# Patient Record
Sex: FEMALE | Race: WHITE | Employment: FULL TIME | URBAN - METROPOLITAN AREA
[De-identification: names, ages, dates, MRNs, and addresses within clinical notes are randomized per-mention and may not be internally consistent; named-entity substitution may affect disease eponyms.]

---

## 2019-01-31 PROBLEM — F41.9 ANXIETY DISORDER: Status: ACTIVE | Noted: 2019-01-31

## 2019-01-31 PROBLEM — F32.4 MAJOR DEPRESSIVE DISORDER IN PARTIAL REMISSION (HCC): Status: ACTIVE | Noted: 2019-01-31

## 2019-02-21 ENCOUNTER — OFFICE VISIT (OUTPATIENT)
Dept: RHEUMATOLOGY | Age: 34
End: 2019-02-21

## 2019-02-21 VITALS
RESPIRATION RATE: 18 BRPM | DIASTOLIC BLOOD PRESSURE: 97 MMHG | HEIGHT: 67 IN | BODY MASS INDEX: 45.99 KG/M2 | TEMPERATURE: 97.9 F | WEIGHT: 293 LBS | SYSTOLIC BLOOD PRESSURE: 147 MMHG | HEART RATE: 97 BPM

## 2019-02-21 DIAGNOSIS — M76.32 ILIOTIBIAL BAND FRICTION SYNDROME OF BOTH KNEES: ICD-10-CM

## 2019-02-21 DIAGNOSIS — M22.2X1 PATELLOFEMORAL ARTHRALGIA OF BOTH KNEES: ICD-10-CM

## 2019-02-21 DIAGNOSIS — M22.2X2 PATELLOFEMORAL ARTHRALGIA OF BOTH KNEES: ICD-10-CM

## 2019-02-21 DIAGNOSIS — E55.9 VITAMIN D DEFICIENCY: ICD-10-CM

## 2019-02-21 DIAGNOSIS — M70.61 TROCHANTERIC BURSITIS OF BOTH HIPS: ICD-10-CM

## 2019-02-21 DIAGNOSIS — M54.2 MUSCULOSKELETAL NECK PAIN: ICD-10-CM

## 2019-02-21 DIAGNOSIS — M70.62 TROCHANTERIC BURSITIS OF BOTH HIPS: ICD-10-CM

## 2019-02-21 DIAGNOSIS — M35.2: Primary | ICD-10-CM

## 2019-02-21 DIAGNOSIS — Z79.60 LONG-TERM USE OF IMMUNOSUPPRESSANT MEDICATION: ICD-10-CM

## 2019-02-21 DIAGNOSIS — M76.31 ILIOTIBIAL BAND FRICTION SYNDROME OF BOTH KNEES: ICD-10-CM

## 2019-02-21 RX ORDER — MONTELUKAST SODIUM 10 MG/1
10 TABLET ORAL DAILY
COMMUNITY
End: 2019-11-25 | Stop reason: SDUPTHER

## 2019-02-21 RX ORDER — METRONIDAZOLE 7.5 MG/G
LOTION TOPICAL DAILY
COMMUNITY
End: 2020-06-15

## 2019-02-21 RX ORDER — GLUCOSAMINE/CHONDR SU A SOD 750-600 MG
TABLET ORAL 2 TIMES DAILY
COMMUNITY
End: 2019-08-21

## 2019-02-21 RX ORDER — BISMUTH SUBSALICYLATE 262 MG
1 TABLET,CHEWABLE ORAL DAILY
COMMUNITY
End: 2022-01-24

## 2019-02-21 RX ORDER — METHOTREXATE 2.5 MG/1
7.5 TABLET ORAL
COMMUNITY
End: 2019-02-21 | Stop reason: ALTCHOICE

## 2019-02-21 NOTE — PROGRESS NOTES
REASON FOR VISIT    This is the initial evaluation for Ms. Billings Duty a 35 y.o.  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 The patient is referred to the [facility] at the request of Dr. Morgan Aburto. HISTORY OF PRESENT ILLNESS     I have reviewed and summarized old records from [facility] ([facility]). She follows with Dr. Morgan Aburto, [facility] and was last seen by him on 12/21/2018. This is his last HPI:    \"She was last seen in this clinic on 8/21/2018. She cancelled her 11/6/18 appointment because her father was in the hospital.    Ms. Nakul Jim is a 35year-old female presenting for follow up of Behcet's Disease. She fulfills both the recently proposed International Criteria for Behcet's Disease ([address] [facility]. 2014 Mar;28(3):338-47) and the older International Study Group Criteria ([employer]. 1990 May 5;335(3184):5360-9630) for Behcet's Disease on the basis of recurrent oral ulcerations on at least 3 occasions over a minimum 12-month period, recurrent genital ulcerations, and evidence of positive pathergy on the hands on examination. She also has known inflammatory arthritis. Disease activity largely remitted since I started her on methotrexate on 8/9/16. At last encounter on 8/21/18, she returned for follow up after a long hiatus. Her mucocutaneous disease remained in deep sustained remission on methotrexate in that interval. She was contemplating having a family within the next 1-2 years. On examination, her mucositis and synovitis were confirmed to remain in remission. She felt well with no complaints related to her systemic rheumatic condition. We initiated a gradual taper down and ultimately off of methotrexate. I noted that patients with sustained remission for a prolonged period of time may be able to tolerate weaning off of immunosuppression. I asked her to decrease from 15 mg weekly to 12.5 mg weekly today.  I planned to reassess at follow up in 3 months. Once she was committed to a time table for family planning, we planned to replace methotrexate with azathioprine if she were unable to taper off the medication with maintained control of her autoimmune disease. She needed a washout off methotrexate of at least 3 months and ideally 6 months before conceiving. Since the time of her last evaluation, she notes the following:    She has tolerated the decrease in methotrexate to 12.5 mg weekly and is eager to continue tapering. She also remains on daily folic acid. She had a viral URI within the past week and is recovering. She did not inform me of her illness until today. She held a week's worth of methotrexate during her illness, but plans to resume this week according to her usual schedule. She is moving to [facility] on [address]. I have made arrangements for Dr. Dipika Chan to take over her care. She has a confirmed appointment with Dr. Dipika Chan on 1/14/19. \"    Under assessment, he wrote \"She fulfills both the recently proposed International Criteria for Behcet's Disease ([address] 2014 Mar;28(3):338-47) and the older International Study Group Criteria (Lancet. 1990 May 5;335(7596):4463-7373) for Behcet's Disease on the basis of recurrent oral ulcerations on at least 3 occasions over a minimum 12-month period, recurrent genital ulcerations, and evidence of positive pathergy on the hands on examination. She also has had inflammatory arthritis. \"    During that visit, she had no mucositis or synovitis on exam and had recommended weaning off methotrexate from 15 mg weekly to 7.5 mg weekly. He also asked her to take leucovorin and folic acid 2 mg daily due to methotrexate fog. 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 She denies a history of thrombosis or miscarriages.      In 12/2212018, labs showed WBC 6.33, Hct 40.3%, platelets 628,287, creatinine 0.80 mg/dL, eGFR 97, albumin 4.7 g/dL, ALK 67 U/L, ALT 18 U/L, AST 18 U/L, vitamin D 25, ESR 58 mm/hr. Today, she no longer has oral ulcers or genital ulcers. She complains of diffuse stabbing, aching constant joint pains that cycle that migrates in a different areas. Today, she has has right outer hip pain, but before she had shoulder and ankles pain. She has right 3rd finger that has been bothering for several months. It is worse when she bends it and sharp at times. It has not worsened with her dose decrease from methotrexate. She notes ankle swelling in the afternoon. She has diffuse body stiffness in the morning that loosens up after minutes. She saw dermatology for facial rosacea and eczema around her lips and was prescribed. She also has a rash on her distal hand that is chronic and she feels that flares with her Bechet. She has not had a biopsy of the skin. methotrexate did not help it. betamethasone helps it. Therapy History includes:    Current DMARD therapy include: methotrexate 7.5 mg every Friday  Prior DMARD therapy include: none  Discontinued DMARDs because of inefficacy: None  Discontinued DMARDs because of side effects: None    REVIEW OF SYSTEMS    A 15 point review of systems was performed and summarized below. The questionnaire was reviewed with the patient and scanned into the patient's medical record.     General: denies recent weight gain, recent weight loss, fatigue, weakness, fever, night sweats  Musculoskeletal: endorses joint pain, joint swelling, morning stiffness (lasting minutes), muscle pain  Ears: denies ringing in ears, loss of hearing, deafness  Eyes: endorses dryness, denies pain, redness, loss of vision, double vision, blurred vision, foreign body sensation  Mouth: endorses dryness, denies sore tongue, oral ulcers, bleeding gums, loss of taste, increased dental caries  Nose: denies nosebleeds, loss of smell, nasal ulcers  Throat: denies frequent sore throats, hoarseness, difficulty in swallowing, pain in jaw while chewing  Neck: denies swollen glands, tender glands  Cardiopulmonary: endorses dry cough, wheezing, denies pain in chest, irregular heart beat, sudden changes in heart beat, shortness of breath, difficulty breathing at night, productive cough, coughing of blood  Gastrointestinal: endorses nausea, heartburn, denies stomach pain relieved by food, vomiting of blood/\"coffee grounds\", jaundice, increasing constipation, persistent diarrhea, blood in stools, black stools  Genitourinary: denies nocturia, difficult urination, pain or burning on urination, blood in urine, cloudy urine, pus in urine, genital discharge, frequent urination, vaginal dryness, rash/ulcers, sexual difficulties   Hematologic: endorses anemia, denies bleeding tendency, blood clots  Skin: endorses easy bruising, redness, hives, nodules/bumps, hair loss, denies sun sensitive, rash, skin tightness, color changes of hands or feet in the cold (Raynaud's)  Neurologic: endorses headaches, intermittent numbness or tingling in hands/feet, denies dizziness, muscle weakness,  memory loss  Psychiatric: endorses depression, excessive worries, denies PTSD, Bipolar  Sleep: endorses poor sleep, snoring, obstructive sleep apnea on CPAP, difficulty staying asleep, denies apnea, daytime somnolence, difficulty falling asleep    PAST MEDICAL HISTORY    She has a past medical history of Alopecia, Anxiety, Asthma, Behcet's disease ([address] Depression, Fibromyalgia, GERD (gastroesophageal reflux disease), Hashimoto's thyroiditis, IBS (irritable bowel syndrome), Insomnia, Sleep apnea, Vascular abnormality, and Vitamin D deficiency. FAMILY HISTORY    Her family history includes Alcohol abuse in her maternal cousin; Anxiety in her paternal aunt;  Arthritis in her mother; Asthma in her mother; Bipolar Disorder in her maternal cousin; Depression in her father; Diabetes in her father; Drug Abuse in her maternal cousin; Heart Disease in her father; Hypertension in her father; Other in her mother and paternal cousin. SOCIAL HISTORY    She reports that  has never smoked. she has never used smokeless tobacco. She reports that she drinks alcohol. She reports that she does not use drugs. GYNECOLOGIC HISTORY     0, Para 0, Living 0, Miscarriage 0  She denies severe pre-eclampsia, eclampsia or placental insufficiency    HEALTH MAINTENANCE    Immunizations    There is no immunization history on file for this patient. Age Appropriate Cancer Screening    PAP Smear: 2018    MEDICATIONS    Current Outpatient Medications   Medication Sig Dispense Refill    Biotin 2,500 mcg cap Take  by mouth daily.  ketoconazole (XOLEGEL) 2 % topical gel Apply  to affected area daily.  metroNIDAZOLE (METROLOTION) 0.75 % lotion Apply  to affected area daily.  montelukast (SINGULAIR) 10 mg tablet Take 10 mg by mouth daily.  multivitamin (ONE A DAY) tablet Take 1 Tab by mouth daily.  buPROPion XL (WELLBUTRIN XL) 300 mg XL tablet Take 1 Tab by mouth daily. 90 Tab 1    citalopram (CELEXA) 20 mg tablet Take 1 Tab by mouth daily. With 40 mg dose 90 Tab 1    traZODone (DESYREL) 50 mg tablet Take 1 Tab by mouth nightly. 30 Tab 1    citalopram (CELEXA) 40 mg tablet Take 1 Tab by mouth daily. With 20 mg dose 90 Tab 1    aspirin delayed-release 81 mg tablet Take 81 mg by mouth daily.  adapalene (DIFFERIN) 0.1 % topical gel Apply 1 Squirt to affected area nightly.  albuterol (VENTOLIN HFA) 90 mcg/actuation inhaler Take 1-2 Puffs by inhalation two (2) times a day.  clindamycin-benzoyl peroxide (BENZACLIN) 1-5 % topical gel Apply 1 Applicator to affected area two (2) times a day.  ergocalciferol (ERGOCALCIFEROL) 50,000 unit capsule Take 50,000 Units by mouth every seven (7) days.  levonorgestrel (MIRENA) 20 mcg/24 hr (5 years) IUD 1 Device by IntraUTERine route as directed.       levothyroxine (SYNTHROID) 200 mcg tablet Take 175 mcg by mouth daily.  loratadine (CLARITIN) 10 mg tablet Take 10 mg by mouth daily.  spironolactone (ALDACTONE) 25 mg tablet Take 25 mg by mouth three (3) times daily. ALLERGIES    No Known Allergies    PHYSICAL EXAMINATION    Visit Vitals  BP (!) 147/97   Pulse 97   Temp 97.9 °F (36.6 °C)   Resp 18   Ht 5' 7\" (1.702 m)   Wt 329 lb (149.2 kg)   BMI 51.53 kg/m²     Body mass index is 51.53 kg/m². General: Patient is alert, oriented x 3, not in acute distress    HEENT:   Conjunctiva are not injected and appear moist, oral mucous membranes are moist, there are no ulcers present, there is no alopecia, neck is supple, there is no lymphadenopathy    Cardiovascular:  Heart is regular rate and rhythm, no murmurs. Chest:  Lungs are clear to auscultation bilaterally. Extremities:  Free of clubbing, cyanosis, edema, extremities well perfused. Neurological exam:  Muscle strength is full in upper and lower extremities. Skin exam:  There are no rashes, no active Raynaud's, no livedo reticularis, no periungual erythema, no scalp tenderness. Facial rosacea  Lip angular erythema  Erythema on left dorsum hand    Musculoskeletal exam:  A comprehensive musculoskeletal exam was performed for all joints of each upper and lower extremity and assessed for swelling, tenderness and range of motion. Pertinent results are documented as below:    Multiple tender points  Muscular tenderness along trapezius  Bilateral torchanteric tenderness  Bilateral iliotibial band tenderness  Knee hyperextension with patellar laxity  No synovitis    DATA REVIEW    Studies Reviewed:     Prior medical records were reviewed and are summarized as below:    Laboratory data: summarized in the HPI    Imaging: summarized in the HPI.       ASSESSMENT AND PLAN    1) Behcet's Disease. (oral ulcer, genital ulcers, synovitis, rash) She has been under clinical remission on methotrexate 15 mg and recently 7.5 mg and was previously following with Dr. Geoff Conway at [facility]. The plan was to wean her off methotrexate since she has no disease recurrence. I suspect her joint pain is from fibromyalgia. I will discontinue her methotrexate, folic acid and leucovorin today. 2) Long Term Use of Immunosuppressants. The patient will be discontinued form immunomodulatory medications (methotrexate). 3) Bilateral Patellofemoral Arthralgia. I referred her to physical therapy. 4) Bilateral Trochanteric Bursitis with Iliotibial BAnd Syndrome. I referred her to physical therapy. 5) Musculoskeletal Neck Pain. This is likely form her poor posture and forward stooping neck posture. The etiology is usually from chronic slouching due to reading, smart phone use, video yesy, and/or computer work. This will cause muscular tension and spasms, which may result with referred headaches. This is not an inflammatory process nor is it an immune mediated condition. It is mechanical and should be treated with targeted physical therapy with the goal of realigning the neck and shoulder girdle into the normal anatomic alignment. Muscle relaxants and analgesics should be used as supportive treatment. I recommend physical therapy. I referred the patient to physical therapy. 6) Vitamin D Deficiency. She is on replacement. I will check her level today. 7) Fibromyalgia. her history, constellation of symptoms, and examination are consistent with a pain syndrome. She has a diagnosis of obstructive sleep apnea. Fibromyalgia is a disease characterized by chronic widespread musculoskeletal pain. Fibromyalgia is caused by abnormal processing of pain signals in the central nervous system, leading to exaggerated pain responses. Non-pharmacologic therapies such as cardiovascular exercise and Cognitive Behavioral Therapy have been shown to be of benefit ([address], Am J Med 2009).  Oscar Chi in particular has proven efficacy in the treatment of fibromyalgia (Fred Houser 2010). If pharmacotherapy is pursued, pregabalin (Lyrica), gabapentin (Neurontin), milnacipran (Violeta Morelle), and duloxetine (Cymbalta) are FDA approved medications for the treatment of fibromyalgia. Narcotics have not been proven to be efficacious in the treatment of fibromyalgia. In fact, narcotic use in this patient population has been observed to exacerbate depression, and may enhance the hyperalgesia which is characteristic of this condition (Lamarijaa Fabian Rheum 2006). They also are at increased risk for opioid-induced hyperalgesia due predominantly to central sensitization Kayleen Han al. Hidalgo Pock Clin Rheumatol. 2013 Mar;19(2):72-7). Specifically, a double-blind placebo-controlled trial by Christiano Oleary al published in 1995 demonstrated that intravenous morphine did not reduce pain in fibromyalgia patients. A study by Beti Cabrera al published in 2003 showed that fibromyalgia patients taking oral opiates did not experience improvement in their pain at four years of follow up, and also reported increased depression over the last two years of the study. There is subsequent concern that the prolonged use of narcotics to treat fibromyalgia may cause harm to these patients Gomez Mcarthur, Pain 2005). Finally, opioid use in fibromyalgia had poorer symptoms and functional and occupational status compared to nonusers (Dionne YUSUF et al. Pain Res Treat. 5736;6815:295560). We therefore recommend that narcotics be avoided in all patients with fibromyalgia. We recommend Oscar Chi stretching exercises for at least 30 minutes per day. The [employer] has made a videotape of Oscar Chi that She can borrow from [email or web address], purchase online or watch for free on [email or web address] Oscar Chi for Arthritis. We discussed treating secondary causes, such as sleep apnea, poor sleep quality, depression, anxiety, weight loss, vitamin deficiencies, such as vitamin D, and pursuing aquatherapy. I encouraged her to do [address].      My recommendations were provided to her and she was informed to follow up with her primary care physician for further management of her fibromyalgia. 8) Skin Rash. I explained that she need to have a biopsy to evaluate for an eczematous process versus an inflammatory process since it has not resolved while on methotrexate. The patient voiced understanding of the aforementioned assessment and plan. Summary of plan was provided in the After Visit Summary patient instructions. I also provided education about MyChart setup and utility.     TODAY'S ORDERS    Orders Placed This Encounter    TSH 3RD GENERATION    CBC WITH AUTOMATED DIFF    METABOLIC PANEL, COMPREHENSIVE    VITAMIN D, 25 HYDROXY    REFERRAL TO PHYSICAL THERAPY    REFERRAL TO PHYSICAL THERAPY    DISCONTD: methotrexate (RHEUMATREX) 2.5 mg tablet     Future Appointments   Date Time Provider Richi Arriola   2/26/2019 11:30 AM Eric Bruno NP [facility]   6/6/2019  8:40 AM Lico Mccann MD [address] [place], [address] [place] [address]    Adult Rheumatology   Rheumatology Ultrasound Certified  [facility]  A Part of [address]   Phone [phone]  Fax [phone]

## 2019-02-22 LAB
25(OH)D3+25(OH)D2 SERPL-MCNC: 23.9 NG/ML (ref 30–100)
ALBUMIN SERPL-MCNC: 4.5 G/DL (ref 3.5–5.5)
ALBUMIN/GLOB SERPL: 1.4 {RATIO} (ref 1.2–2.2)
ALP SERPL-CCNC: 71 IU/L (ref 39–117)
ALT SERPL-CCNC: 20 IU/L (ref 0–32)
AST SERPL-CCNC: 17 IU/L (ref 0–40)
BASOPHILS # BLD AUTO: 0 X10E3/UL (ref 0–0.2)
BASOPHILS NFR BLD AUTO: 0 %
BILIRUB SERPL-MCNC: 0.3 MG/DL (ref 0–1.2)
BUN SERPL-MCNC: 15 MG/DL (ref 6–20)
BUN/CREAT SERPL: 19 (ref 9–23)
CALCIUM SERPL-MCNC: 9.9 MG/DL (ref 8.7–10.2)
CHLORIDE SERPL-SCNC: 99 MMOL/L (ref 96–106)
CO2 SERPL-SCNC: 26 MMOL/L (ref 20–29)
CREAT SERPL-MCNC: 0.81 MG/DL (ref 0.57–1)
EOSINOPHIL # BLD AUTO: 0.2 X10E3/UL (ref 0–0.4)
EOSINOPHIL NFR BLD AUTO: 4 %
ERYTHROCYTE [DISTWIDTH] IN BLOOD BY AUTOMATED COUNT: 13.8 % (ref 12.3–15.4)
GLOBULIN SER CALC-MCNC: 3.3 G/DL (ref 1.5–4.5)
GLUCOSE SERPL-MCNC: 83 MG/DL (ref 65–99)
HCT VFR BLD AUTO: 41.3 % (ref 34–46.6)
HGB BLD-MCNC: 13.6 G/DL (ref 11.1–15.9)
IMM GRANULOCYTES # BLD AUTO: 0 X10E3/UL (ref 0–0.1)
IMM GRANULOCYTES NFR BLD AUTO: 0 %
LYMPHOCYTES # BLD AUTO: 1.8 X10E3/UL (ref 0.7–3.1)
LYMPHOCYTES NFR BLD AUTO: 36 %
MCH RBC QN AUTO: 32.4 PG (ref 26.6–33)
MCHC RBC AUTO-ENTMCNC: 32.9 G/DL (ref 31.5–35.7)
MCV RBC AUTO: 98 FL (ref 79–97)
MONOCYTES # BLD AUTO: 0.4 X10E3/UL (ref 0.1–0.9)
MONOCYTES NFR BLD AUTO: 8 %
NEUTROPHILS # BLD AUTO: 2.5 X10E3/UL (ref 1.4–7)
NEUTROPHILS NFR BLD AUTO: 52 %
PLATELET # BLD AUTO: 318 X10E3/UL (ref 150–379)
POTASSIUM SERPL-SCNC: 4.7 MMOL/L (ref 3.5–5.2)
PROT SERPL-MCNC: 7.8 G/DL (ref 6–8.5)
RBC # BLD AUTO: 4.2 X10E6/UL (ref 3.77–5.28)
SODIUM SERPL-SCNC: 142 MMOL/L (ref 134–144)
TSH SERPL DL<=0.005 MIU/L-ACNC: 0.58 UIU/ML (ref 0.45–4.5)
WBC # BLD AUTO: 4.9 X10E3/UL (ref 3.4–10.8)

## 2019-02-25 NOTE — PROGRESS NOTES
The results were reviewed and a letter was sent. Vitamin D is low --> continue ergocalciferol. Remaining labs are good.

## 2019-03-07 ENCOUNTER — PATIENT MESSAGE (OUTPATIENT)
Dept: RHEUMATOLOGY | Age: 34
End: 2019-03-07

## 2019-03-12 RX ORDER — ASPIRIN 81 MG/1
81 TABLET ORAL DAILY
Qty: 90 TAB | Refills: 0 | OUTPATIENT
Start: 2019-03-12

## 2019-03-12 RX ORDER — ERGOCALCIFEROL 1.25 MG/1
50000 CAPSULE ORAL
Qty: 12 CAP | Refills: 0 | Status: SHIPPED | OUTPATIENT
Start: 2019-03-12 | End: 2019-03-13 | Stop reason: DRUGHIGH

## 2019-03-12 RX ORDER — ERGOCALCIFEROL 1.25 MG/1
50000 CAPSULE ORAL
COMMUNITY
End: 2019-03-13 | Stop reason: DRUGHIGH

## 2019-03-13 RX ORDER — ASPIRIN 81 MG/1
81 TABLET ORAL DAILY
Qty: 90 TAB | Refills: 4 | Status: SHIPPED | OUTPATIENT
Start: 2019-03-13 | End: 2020-02-10

## 2019-03-13 RX ORDER — ERGOCALCIFEROL 1.25 MG/1
50000 CAPSULE ORAL
Qty: 12 CAP | Refills: 4 | Status: SHIPPED | OUTPATIENT
Start: 2019-03-14 | End: 2019-10-10 | Stop reason: SDUPTHER

## 2019-05-31 PROBLEM — E66.01 OBESITY, MORBID (HCC): Status: ACTIVE | Noted: 2019-05-31

## 2019-07-17 PROBLEM — S46.912A STRAIN OF LEFT SHOULDER: Status: ACTIVE | Noted: 2019-07-17

## 2019-07-17 PROBLEM — S39.012A LUMBAR STRAIN, INITIAL ENCOUNTER: Status: ACTIVE | Noted: 2019-07-17

## 2019-07-17 PROBLEM — M35.2 BEHCET'S DISEASE (HCC): Status: ACTIVE | Noted: 2019-07-17

## 2019-07-17 PROBLEM — S76.012A MUSCLE STRAIN OF LEFT GLUTEAL REGION: Status: ACTIVE | Noted: 2019-07-17

## 2019-08-21 ENCOUNTER — OFFICE VISIT (OUTPATIENT)
Dept: RHEUMATOLOGY | Age: 34
End: 2019-08-21

## 2019-08-21 VITALS
BODY MASS INDEX: 45.99 KG/M2 | TEMPERATURE: 97.9 F | DIASTOLIC BLOOD PRESSURE: 94 MMHG | SYSTOLIC BLOOD PRESSURE: 151 MMHG | RESPIRATION RATE: 18 BRPM | WEIGHT: 293 LBS | HEIGHT: 67 IN | HEART RATE: 91 BPM

## 2019-08-21 DIAGNOSIS — E55.9 VITAMIN D DEFICIENCY: ICD-10-CM

## 2019-08-21 DIAGNOSIS — M35.2 BEHCET'S DISEASE (HCC): Primary | ICD-10-CM

## 2019-08-21 DIAGNOSIS — M22.2X2 PATELLOFEMORAL ARTHRALGIA OF BOTH KNEES: ICD-10-CM

## 2019-08-21 DIAGNOSIS — Z79.60 LONG-TERM USE OF IMMUNOSUPPRESSANT MEDICATION: ICD-10-CM

## 2019-08-21 DIAGNOSIS — M22.2X1 PATELLOFEMORAL ARTHRALGIA OF BOTH KNEES: ICD-10-CM

## 2019-08-21 RX ORDER — DULOXETIN HYDROCHLORIDE 30 MG/1
30 CAPSULE, DELAYED RELEASE ORAL DAILY
COMMUNITY
End: 2019-08-27 | Stop reason: SDUPTHER

## 2019-08-21 NOTE — PATIENT INSTRUCTIONS
Apremilast (By mouth)   Apremilast (a-PRE-mi-last)  Treats psoriatic arthritis and plaque psoriasis. Brand Name(s): Mónica Cr Starter Pack   There may be other brand names for this medicine. When This Medicine Should Not Be Used: This medicine is not right for everyone. Do not use it if you had an allergic reaction to apremilast.  How to Use This Medicine:   Tablet  · Take your medicine as directed. Your dose may need to be changed several times to find what works best for you. · Swallow the tablet whole. Do not crush, break, or chew it. · Missed dose: Take a dose as soon as you remember. If it is almost time for your next dose, wait until then and take a regular dose. Do not take extra medicine to make up for a missed dose. · Store the medicine in a closed container at room temperature, away from heat, moisture, and direct light. Drugs and Foods to Avoid:   Ask your doctor or pharmacist before using any other medicine, including over-the-counter medicines, vitamins, and herbal products. · Some medicines can affect how apremilast works. Tell your doctor if you are using carbamazepine, phenobarbital, phenytoin, or rifampin. Warnings While Using This Medicine:   · Tell your doctor if you are pregnant or breastfeeding, or if you have kidney disease, low blood pressure, or a history of depression. · This medicine may cause depression or thoughts of suicide. · Your doctor will check your progress and the effects of this medicine at regular visits. Keep all appointments. · Keep all medicine out of the reach of children. Never share your medicine with anyone.   Possible Side Effects While Using This Medicine:   Call your doctor right away if you notice any of these side effects:  · Allergic reaction: Itching or hives, swelling in your face or hands, swelling or tingling in your mouth or throat, chest tightness, trouble breathing  · Feeling sad or depressed  · Severe diarrhea, nausea, vomiting  · Unusual changes in mood or behavior  If you notice these less serious side effects, talk with your doctor:   · Headache  · Weight loss  If you notice other side effects that you think are caused by this medicine, tell your doctor. Call your doctor for medical advice about side effects. You may report side effects to FDA at 7-005-FDA-1198  © 2017 [address] Information is for End User's use only and may not be sold, redistributed or otherwise used for commercial purposes. The above information is an  only. It is not intended as medical advice for individual conditions or treatments. Talk to your doctor, nurse or pharmacist before following any medical regimen to see if it is safe and effective for you.

## 2019-08-21 NOTE — PROGRESS NOTES
REASON FOR VISIT    This is a follow-up visit for Ms. David Gill for     ICD-10-CM   1. Behcet's syndrome ([facility] M35.2     Immunosuppression Screening (pending):  Quantiferon TB: pending  PPD:  Not performed  Hepatitis B: pending  Hepatitis C: pending    Therapy History includes:  Current DMARD therapy include: none  Prior DMARD therapy include: methotrexate 7.5 - 15 mg weekly  Discontinued DMARDs because of inefficacy: None  Discontinued DMARDs because of side effects: None  Contra-Indicated DMARDs because of pregnancy planning: methotrexate     Immunization History   Administered Date(s) Administered    Pneumococcal Polysaccharide (PPSV-23) 03/09/2017       Patient Active Problem List   Diagnosis Code    Major depressive disorder in partial remission ([address] F32.4    Anxiety disorder F41.9    Obesity, morbid ([address] E66.01    Behcet's disease ([address] M35.2    Strain of left shoulder S46.912A    Lumbar strain, initial encounter S39.012A    Muscle strain of left gluteal region S76.012A    Vitamin D deficiency E55.9    Long-term use of immunosuppressant medication Z79.899       HISTORY OF PRESENT ILLNESS    Ms. David Gill returns for a follow-up. On her last visit, I discontinued methotrexate since she was in remission. I felt her pain was from fibromyalgia. I reviewed [facility] records. Today, she complains of recurrence of genital and oral ulcers after stopping methotrexate around 6/24/2019. She also has been having more joint pain especially in her ankles associated with stiffness lasting up to an hour. The pain is aching. She cannot walk barefoot because of aggravated pain. Motrin has helped. She endorses sores in mouth, dry cough which may be from GERD.     She denies fever, weight loss, blurred vision, vision loss, ankle swelling, dyspnea, nausea, vomiting, dysphagia, abdominal pain, black or bloody stool, fall since last visit, rash, easy bruising and increased thirst.    Last toxicity monitoring by blood work was done on 8/02/2019 and did not reveal any significant adverse effects. The patient has had any interval hospital admissions, infections, or surgeries (except anal surgery)    REVIEW OF SYSTEMS    A comprehensive review of systems was performed and pertinent results are documented in the HPI, review of systems is otherwise non-contributory. PAST MEDICAL HISTORY    She has a past medical history of Alopecia, Anxiety, Asthma, Behcet's disease ([address] Depression, Fibromyalgia, GERD (gastroesophageal reflux disease), Hashimoto's thyroiditis, IBS (irritable bowel syndrome), Insomnia, Sleep apnea, Vascular abnormality, and Vitamin D deficiency. FAMILY HISTORY    Her family history includes Alcohol abuse in her maternal cousin; Anxiety in her paternal aunt; Arthritis in her mother; Asthma in her mother; Bipolar Disorder in her maternal cousin; Depression in her father; Diabetes in her father; Drug Abuse in her maternal cousin; Heart Disease in her father; Hypertension in her father; Other in her mother and paternal cousin. SOCIAL HISTORY    She reports that she has never smoked. She has never used smokeless tobacco. She reports that she drinks alcohol. She reports that she does not use drugs. MEDICATIONS    Current Outpatient Medications   Medication Sig Dispense Refill    DULoxetine (CYMBALTA) 30 mg capsule Take 30 mg by mouth daily.  apremilast (OTEZLA) 30 mg tab Take 30 mg by mouth two (2) times a day. Indications: Behcet's disease with mouth ulcers 30 Tab 11    buPROPion XL (WELLBUTRIN XL) 150 mg tablet Take 1 Tab by mouth daily. (Patient taking differently: Take 300 mg by mouth daily.) 30 Tab 0    augmented betamethasone dipropionate (DIPROLENE-AF) 0.05 % topical cream Apply  to affected area as needed for Skin Irritation.  Minoxidil (ROGAINE) 5 % foam by Apply Externally route.  Bacillus/protease/amylas/lipas (DIGESTIVE ADVANTAGE INTENS BOW PO) Take  by mouth.       traZODone (DESYREL) 50 mg tablet Take 1 Tab by mouth nightly. 90 Tab 1    aspirin delayed-release 81 mg tablet Take 1 Tab by mouth daily. 90 Tab 4    ergocalciferol (ERGOCALCIFEROL) 50,000 unit capsule Take 1 Cap by mouth every Monday and Thursday. 12 Cap 4    ketoconazole (XOLEGEL) 2 % topical gel Apply  to affected area daily.  metroNIDAZOLE (METROLOTION) 0.75 % lotion Apply  to affected area daily.  montelukast (SINGULAIR) 10 mg tablet Take 10 mg by mouth daily.  multivitamin (ONE A DAY) tablet Take 1 Tab by mouth daily.  adapalene (DIFFERIN) 0.1 % topical gel Apply 1 Squirt to affected area nightly.  albuterol (VENTOLIN HFA) 90 mcg/actuation inhaler Take 1-2 Puffs by inhalation two (2) times a day.  clindamycin-benzoyl peroxide (BENZACLIN) 1-5 % topical gel Apply 1 Applicator to affected area two (2) times a day.  levonorgestrel (MIRENA) 20 mcg/24 hr (5 years) IUD 1 Device by IntraUTERine route as directed.  levothyroxine (SYNTHROID) 200 mcg tablet Take 175 mcg by mouth daily.  loratadine (CLARITIN) 10 mg tablet Take 10 mg by mouth daily.  spironolactone (ALDACTONE) 25 mg tablet Take 25 mg by mouth three (3) times daily. ALLERGIES    No Known Allergies    PHYSICAL EXAMINATION    Visit Vitals  BP (!) 151/94   Pulse 91   Temp 97.9 °F (36.6 °C)   Resp 18   Ht 5' 7\" (1.702 m)   Wt 329 lb (149.2 kg)   BMI 51.53 kg/m²     Body mass index is 51.53 kg/m². General: Patient is alert, oriented x 3, not in acute distress    HEENT:   Sclerae are not injected and appear moist.  There is no alopecia. Neck is supple     Cardiovascular:  Heart is regular rate and rhythm, no murmurs. Chest:  Lungs are clear to auscultation bilaterally. No rhonchi, wheezes, or crackles.     Extremities:  Free of clubbing, cyanosis, edema    Neurological exam:  Muscle strength is full in upper and lower extremities     Skin exam:  There are no rashes, no alopecia, no discoid lesions, no active Raynaud's, no livedo reticularis, no periungual erythema. Facial rosacea  Erythema on left dorsum hand    Musculoskeletal exam:  A comprehensive musculoskeletal exam was performed for all joints of each upper and lower extremity and assessed for swelling, tenderness and range of motion. Positive results are documented as below:    Bilateral ankle tenderness    DATA REVIEW    Laboratory     Recent laboratory results were reviewed, summarized, and discussed with the patient. Imaging    Musculoskeletal Ultrasound    None    Radiographs    None    CT Imaging    None    MR Imaging    None    DXA     None    ASSESSMENT AND PLAN    This is a follow-up visit for Ms. Sahil Potter. 1) Behcet's Disease. (oral ulcer, genital ulcers, synovitis, rash) She has been under clinical remission on methotrexate 15 mg and recently 7.5 mg and was previously following with Dr. Syed Snowden at [facility]. I discontinued it but she had recurrence of her oral and vaginal ulcers. She has developed bilateral ankle arthritis. She is planning on pregnancy so I will avoid methotrexate. Clearence Cool was recently FDA approved for Behcet's disease, so I will start it. I gave her two loading dose samples. She is planning on pregnancy, so I will avoid methotrexate. Clearence Cool should be stopped within a week of conception planning. 2) Long Term Use of Immunosuppressants. The patient remains on immunomodulatory medications Martinazaida Mojica) and requires frequent toxicity monitoring by blood work. Respective labs were ordered (Quantiferon TB, chronic hepatitis panel). 3) Bilateral Patellofemoral Arthralgia. I had referred her to physical therapy. 4) Bilateral Trochanteric Bursitis with Iliotibial BAnd Syndrome. I hadreferred her to physical therapy. 5) Musculoskeletal Neck Pain. I had referred the patient to physical therapy. 6) Vitamin D Deficiency. She is on replacement. Her vitamin D was 23.9.  She is now on ergocalciferol 50,000 weekly. I will check her level. 7) Fibromyalgia. her history, constellation of symptoms, and examination are consistent with a pain syndrome. She has a diagnosis of obstructive sleep apnea. 8) Skin Rash. She needs to have a biopsy to evaluate for an eczematous process versus an inflammatory process since it has not resolved while on methotrexate. The patient voiced understanding of the aforementioned assessment and plan. Summary of plan was provided in the After Visit Summary patient instructions.      TODAY'S ORDERS    Orders Placed This Encounter    QUANTIFERON-TB GOLD PLUS    CHRONIC HEPATITIS PANEL    VITAMIN D, 25 HYDROXY    apremilast (OTEZLA) 30 mg tab     Future Appointments   Date Time Provider Richi Arriola   9/30/2019 11:00 AM Cesilia Cope NP [facility] [address]   11/19/2019  8:40 AM Raj Shipley MD [place], [place], [address] [facility]    Adult Rheumatology   Rheumatology Ultrasound Certified  [facility]  A Part of [facility], [address] [place] [address], [address] [place] [address]   Phone [phone]  Fax [phone]

## 2019-08-21 NOTE — PROGRESS NOTES
Chief Complaint   Patient presents with    Other     Behcet's syndrome     1. Have you been to the ER, urgent care clinic since your last visit? Hospitalized since your last visit? No    2. Have you seen or consulted any other health care providers outside of the [address] [place] [address] since your last visit? Include any pap smears or colon screening.  Yes Dr. Lowry Knows at [facility] for rectal surgery

## 2019-08-21 NOTE — LETTER
8/21/19 Patient: Tre Vasques YOB: 1985 Date of Visit: 8/21/2019 Bonnie Edwards MD 
[address] [id number] VIA In Basket Dear Bonnie Edwards MD, Thank you for referring Ms. El Fu to [address] [place] [address] for evaluation. My notes for this consultation are attached. If you have questions, please do not hesitate to call me. I look forward to following your patient along with you.  
 
 
Sincerely, 
 
Nimesh Guy MD

## 2019-08-22 LAB
25(OH)D3+25(OH)D2 SERPL-MCNC: 38.2 NG/ML (ref 30–100)
COMMENT, 144067: NORMAL
HBV CORE AB SERPL QL IA: NEGATIVE
HBV CORE IGM SERPL QL IA: NEGATIVE
HBV E AB SERPL QL IA: NEGATIVE
HBV E AG SERPL QL IA: NEGATIVE
HBV SURFACE AB SER QL: REACTIVE
HBV SURFACE AG SERPL QL IA: NEGATIVE
HCV AB S/CO SERPL IA: <0.1 S/CO RATIO (ref 0–0.9)

## 2019-08-28 LAB
GAMMA INTERFERON BACKGROUND BLD IA-ACNC: 0.02 IU/ML
M TB IFN-G BLD-IMP: NEGATIVE
M TB IFN-G CD4+ BCKGRND COR BLD-ACNC: 0.05 IU/ML
MITOGEN IGNF BLD-ACNC: >10 IU/ML
QUANTIFERON INCUBATION, QF1T: NORMAL
QUANTIFERON TB2 AG: 0.02 IU/ML
SERVICE CMNT-IMP: NORMAL

## 2019-09-10 ENCOUNTER — DOCUMENTATION ONLY (OUTPATIENT)
Dept: RHEUMATOLOGY | Age: 34
End: 2019-09-10

## 2019-09-10 NOTE — PROGRESS NOTES
Faxed patient assistance forms to [employer], with a note patient needs bridge medication asap due to leaving the country  next week.

## 2019-09-11 DIAGNOSIS — M35.2 BEHCET'S DISEASE (HCC): ICD-10-CM

## 2019-09-12 ENCOUNTER — TELEPHONE (OUTPATIENT)
Dept: RHEUMATOLOGY | Age: 34
End: 2019-09-12

## 2019-09-12 NOTE — TELEPHONE ENCOUNTER
----- Message from Pavel Chacon sent at 2019 10:51 AM EDT -----  Regarding: Dr. Дмитрий Daley first and last name: Yunier cabrera plus      Reason for call: authorization for prescription \"Otezla\".  A Start form is needed and a final level of appeal. They have a question about the appeal letter that was sent ( has the last prior authorization )       Callback required yes/no and why: yes      Best contact number(s): [phone]  fax number  [phone] reference [phone]      Details to clarify the request:      Pavel Chacon

## 2019-09-13 DIAGNOSIS — M35.2 BEHCET'S DISEASE (HCC): ICD-10-CM

## 2019-09-17 DIAGNOSIS — M35.2 BEHCET'S DISEASE (HCC): ICD-10-CM

## 2019-09-19 ENCOUNTER — TELEPHONE (OUTPATIENT)
Dept: RHEUMATOLOGY | Age: 34
End: 2019-09-19

## 2019-09-19 ENCOUNTER — DOCUMENTATION ONLY (OUTPATIENT)
Dept: PHARMACY | Age: 34
End: 2019-09-19

## 2019-09-19 NOTE — TELEPHONE ENCOUNTER
----- Message from Jalene Barthel sent at 9/19/2019  1:33 PM EDT -----  Regarding: Dr. Jannet Eller Message/Vendor Calls    Caller's first and last name: Denisejorgito Goltz (Henrik)       Reason for call: Requesting a call back concerning PA for Rx Esau Friedman required yes/no and why:Yes      Best contact number(s):[phone] Ext [phone]      Details to clarify the request:      Jalene Barthel

## 2019-09-19 NOTE — TELEPHONE ENCOUNTER
----- Message from Radha Agrawal sent at 9/19/2019  8:40 AM EDT -----  Regarding: Dr. Marcus Herron: Sadiq Romero, [facility]  Reason for call: wants to know if PT received Otezla starter pack.   Callback requested: YES  Best contact: [phone]  Additional details:

## 2019-09-19 NOTE — PROGRESS NOTES
[facility] at [address] [place] [address] Update    Date: 9/11/19    Asad Gonzalez 1985    Medication: Melissa Michel Starter pack and Maintenance dose    A new Prescription needs to be sent to [place]:     Phone: [phone]     Email sent to [facility], [address] at [facility], [facility] Kevin   [place], [address]  phone: [phone]   fax: [phone]

## 2019-09-19 NOTE — TELEPHONE ENCOUNTER
Returned call to [facility] support and let them know that she has been started on the starter pack that was given in the office and the date she was given the medication. They stated an understanding and will update their records.

## 2019-09-23 ENCOUNTER — TELEPHONE (OUTPATIENT)
Dept: RHEUMATOLOGY | Age: 34
End: 2019-09-23

## 2019-09-23 NOTE — TELEPHONE ENCOUNTER
Samantha Greenfield is calling at Phone: [phone], Option 3,  due to the Otezla 30 MG maintenance dose has been denied. Peer to Peer phone is [phone].

## 2019-10-01 ENCOUNTER — DOCUMENTATION ONLY (OUTPATIENT)
Dept: RHEUMATOLOGY | Age: 34
End: 2019-10-01

## 2019-10-01 NOTE — PROGRESS NOTES
Spoke with [employer], and she states patient is on the Crispify at present. Left message for a Peer to Peer for Dr. Radha Hill.

## 2019-10-10 RX ORDER — ERGOCALCIFEROL 1.25 MG/1
50000 CAPSULE ORAL
Qty: 12 CAP | Refills: 3 | Status: SHIPPED | OUTPATIENT
Start: 2019-10-10 | End: 2020-03-10

## 2019-10-24 ENCOUNTER — DOCUMENTATION ONLY (OUTPATIENT)
Dept: RHEUMATOLOGY | Age: 34
End: 2019-10-24

## 2019-12-23 DIAGNOSIS — M35.2 BEHCET'S DISEASE (HCC): ICD-10-CM

## 2020-02-10 RX ORDER — ASPIRIN 81 MG/1
81 TABLET ORAL DAILY
Qty: 90 TAB | Refills: 3 | Status: SHIPPED | OUTPATIENT
Start: 2020-02-10 | End: 2020-11-30

## 2020-03-10 RX ORDER — ERGOCALCIFEROL 1.25 MG/1
50000 CAPSULE ORAL
Qty: 12 CAP | Refills: 2 | Status: SHIPPED | OUTPATIENT
Start: 2020-03-12 | End: 2020-07-08

## 2020-05-13 PROBLEM — F32.5 MAJOR DEPRESSIVE DISORDER WITH SINGLE EPISODE, IN FULL REMISSION (HCC): Status: ACTIVE | Noted: 2019-01-31

## 2020-05-13 PROBLEM — J30.9 CHRONIC ALLERGIC RHINITIS: Status: ACTIVE | Noted: 2020-05-13

## 2020-05-13 PROBLEM — E03.9 ACQUIRED HYPOTHYROIDISM: Status: ACTIVE | Noted: 2020-05-13

## 2020-05-13 PROBLEM — I10 HYPERTENSION, ESSENTIAL: Status: ACTIVE | Noted: 2020-05-13

## 2020-06-05 ENCOUNTER — TELEPHONE (OUTPATIENT)
Dept: RHEUMATOLOGY | Age: 35
End: 2020-06-05

## 2020-06-05 NOTE — TELEPHONE ENCOUNTER
Spoke to pt informed pt that per Dr Chad Saldaña he would like the pt to be scheduled for a telemed appt so that the treatment plan can be discussed in detail, pt verbally acknowledged understanding.  Pt was scheduled for the next available telemed appt

## 2020-06-05 NOTE — TELEPHONE ENCOUNTER
----- Message from Lala Gonzalez MD sent at 6/5/2020 12:50 PM EDT -----  Regarding: FW: Prescription Question  Contact: [phone]  Schedule as telemedicine with me  ----- Message -----  From: Oneil Matias LPN  Sent: 6/0/3548  12:16 PM EDT  To: Lala Gonzalez MD  Subject: FW: Prescription Question                          ----- Message -----  From: Ethel León: 6/5/2020  10:57 AM EDT  To: [facility] Nurse Pool  Subject: Prescription Question                            Email 2/2    PAC has more Vitamin C and D and also has EPA and Choline which P doesn't have, but P has more Vitamins A, E, B1, B2, Niacin, B6, B12, Biotin, Pantothenic Acid, Calcium, Iron, Magnesium, Zinc and Copper and has Omega 3s and DHA which PAC doesn't. Both have the same amount of Folic Acid and Iodine. There is also One a Day Prenatal 1 which is the same as PAC without the Choline. I've attached the supplement facts for both PAC and P if you want to review them. Thank you!

## 2020-06-15 ENCOUNTER — VIRTUAL VISIT (OUTPATIENT)
Dept: RHEUMATOLOGY | Age: 35
End: 2020-06-15

## 2020-06-15 DIAGNOSIS — M35.2 BEHCET'S DISEASE (HCC): Primary | ICD-10-CM

## 2020-06-15 DIAGNOSIS — Z34.90 PLANNED PREGNANCY: ICD-10-CM

## 2020-06-15 DIAGNOSIS — E55.9 VITAMIN D DEFICIENCY: ICD-10-CM

## 2020-06-15 NOTE — PROGRESS NOTES
REASON FOR VISIT    This is a follow-up visit for Ms. Nandini Nesbitt for     ICD-10-CM   1. Behcet's syndrome [facility]) M35.2     The patient has consented for synchronous (real-time) Telemedicine (audio-video technology) on 6/15/2020 for their care to be delivered over telemedicine in place of their regularly scheduled office visit pursuant to the emergency declaration under the [address] [place] [address] waiver authority and the [employer] Act, this Virtual  Visit was conducted, with patient's consent, to reduce the patient's risk of exposure to COVID-19 and provide continuity of care for an established patient. Services were provided through a video synchronous discussion virtually to substitute for in-person clinic visit.     Immunosuppression Screening (8/21/2020):  Quantiferon TB: negative  PPD:  Not performed  Hepatitis B: negative  Hepatitis C: negative    Therapy History includes:  Current DMARD therapy include: none  Prior DMARD therapy include: methotrexate 7.5 to 15 mg weekly, Otezla (8/21/2019 to 10/2019; INSURANCE)  Discontinued DMARDs because of inefficacy: None  Discontinued DMARDs because of side effects: None  Contra-Indicated DMARDs because of pregnancy planning: methotrexate     Immunization History   Administered Date(s) Administered    Pneumococcal Polysaccharide (PPSV-23) 03/09/2017    Tdap 08/27/2019       Patient Active Problem List   Diagnosis Code    Major depressive disorder with single episode, in full remission ([address] F32.5    Anxiety disorder F41.9    Obesity, morbid ([address] E66.01    Behcet's disease ([facility] M35.2    Strain of left shoulder S46.912A    Lumbar strain, initial encounter S39.012A    Muscle strain of left gluteal region S76.012A    Vitamin D deficiency E55.9    Long-term use of immunosuppressant medication Z79.899    Acquired hypothyroidism E03.9    Hypertension, essential I10    Chronic allergic rhinitis J30.9       HISTORY OF PRESENT ILLNESS    Ms. Melody Farmer returns for a follow-up. On her last visit, I gave her Galileo Littler for [employer]. She did not receive it from her pharmacy due to insurance issues. She did take about 3 months of treatment with good tolerance and resolution of genito-oral ulcers. She removed her IUD last week and is planning pregnancy. Today, she no longer has recurrence of genital and oral ulcers. I reviewed outside labs with her vitamin D which was done at 6/10/2020 at 45.5. She denies fever, weight loss, blurred vision, vision loss, oral ulcers, ankle swelling, dry cough, dyspnea, nausea, vomiting, dysphagia, abdominal pain, black or bloody stool, fall since last visit, rash, easy bruising and increased thirst.    Last toxicity monitoring by blood work was done on 8/02/2019 and did not reveal any significant adverse effects. The patient has had any interval hospital admissions, infections, or surgeries. REVIEW OF SYSTEMS    A comprehensive review of systems was performed and pertinent results are documented in the HPI, review of systems is otherwise non-contributory. PAST MEDICAL HISTORY    She has a past medical history of Alopecia, Anxiety, Asthma, Behcet's disease ([address] Depression, Fibromyalgia, GERD (gastroesophageal reflux disease), Hashimoto's thyroiditis, IBS (irritable bowel syndrome), Insomnia, Sleep apnea, Vascular abnormality, and Vitamin D deficiency. FAMILY HISTORY    Her family history includes Alcohol abuse in her maternal cousin; Anxiety in her paternal aunt; Arthritis in her mother; Asthma in her mother; Bipolar Disorder in her maternal cousin; Depression in her father; Diabetes in her father; Drug Abuse in her maternal cousin; Heart Disease in her father; Hypertension in her father; Other in her mother and paternal cousin. SOCIAL HISTORY    She reports that she has never smoked.  She has never used smokeless tobacco. She reports current alcohol use. She reports that she does not use drugs. MEDICATIONS    Current Outpatient Medications   Medication Sig Dispense Refill    montelukast (SINGULAIR) 10 mg tablet Take 1 Tab by mouth daily. 90 Tab 1    ergocalciferol (ERGOCALCIFEROL) 1,250 mcg (50,000 unit) capsule Take 1 Cap by mouth every Monday and Thursday. 12 Cap 2    buPROPion XL (WELLBUTRIN XL) 150 mg tablet Take 1 Tab by mouth daily. 90 Tab 1    aspirin delayed-release 81 mg tablet Take 1 Tab by mouth daily. 90 Tab 3    DULoxetine (CYMBALTA) 30 mg capsule Take 1 Cap by mouth daily. 90 Cap 1    albuterol (VENTOLIN HFA) 90 mcg/actuation inhaler Take 1-2 Puffs by inhalation two (2) times a day. 1 Inhaler 5    augmented betamethasone dipropionate (DIPROLENE-AF) 0.05 % topical cream Apply  to affected area as needed for Skin Irritation.  Minoxidil (ROGAINE) 5 % foam by Apply Externally route two (2) times a day.  Bacillus/protease/amylas/lipas (DIGESTIVE ADVANTAGE INTENS BOW PO) Take  by mouth.  ketoconazole (XOLEGEL) 2 % topical gel Apply  to affected area daily.  multivitamin (ONE A DAY) tablet Take 1 Tab by mouth daily.  clindamycin-benzoyl peroxide (BENZACLIN) 1-5 % topical gel Apply 1 Applicator to affected area two (2) times a day.  levothyroxine (SYNTHROID) 200 mcg tablet Take 175 mcg by mouth daily.  loratadine (CLARITIN) 10 mg tablet Take 10 mg by mouth daily.  apremilast (OTEZLA) 30 mg tab Take 30 mg by mouth two (2) times a day. Indications: Behcet's disease with mouth ulcers 60 Tab 11        ALLERGIES    No Known Allergies    PHYSICAL EXAMINATION    There were no vitals taken for this visit. There is no height or weight on file to calculate BMI. General: Patient is alert, oriented x 3, not in acute distress    HEENT:   Sclerae are not injected and appear moist.  There is no alopecia.  Neck is supple     Chest:  Breathing comfortably at room air    Extremities:  Free of clubbing, cyanosis, edema    Neurological exam:  Muscle strength is full in upper and lower extremities     Skin exam:    Previous exam    Facial rosacea  Erythema on left dorsum hand    Musculoskeletal exam:  A comprehensive musculoskeletal exam was performed for all joints of each upper and lower extremity and assessed for swelling, tenderness and range of motion. Positive results are documented as below:    Bilateral ankle tenderness    DATA REVIEW    Laboratory     Recent laboratory results were reviewed, summarized, and discussed with the patient. Imaging    Musculoskeletal Ultrasound    None    Radiographs    None    CT Imaging    None    MR Imaging    None    DXA     None    ASSESSMENT AND PLAN    This is a follow-up visit for Ms. Peggy Starr. 1) Behcet's Disease. (oral ulcer, genital ulcers, synovitis, rash) She has been under clinical remission on methotrexate 15 mg and recently 7.5 mg and was previously following with Dr. Homero Dowd at [facility]. I discontinued it but she had recurrence of her oral and vaginal ulcers. She has developed bilateral ankle arthritis. She is planning on pregnancy so I will avoid methotrexate. Venia Dys was recently FDA approved for Behcet's disease, so I will start it. I gave her two loading dose samples. She is planning on pregnancy, so I will avoid methotrexate. Venia Dys should be stopped within a week of conception planning however, she has not been taking it. She took it for about 3 months and now no longer has recurrence of her genital or oral ulcers. 2) Long Term Use of Immunosuppressants. The patient is not on immunomodulatory medications. 3) Bilateral Patellofemoral Arthralgia. I had referred her to physical therapy. 4) Bilateral Trochanteric Bursitis with Iliotibial Band Syndrome. I hadreferred her to physical therapy. 5) Musculoskeletal Neck Pain. I had referred the patient to physical therapy. 6) Vitamin D Deficiency.  She is now on ergocalciferol 50,000 biweekly and daily pre-meliza vitamin. Her vitamin D was 45.5 (previously 23.9). I asked her to continue. 7) Fibromyalgia. her history, constellation of symptoms, and examination are consistent with a pain syndrome. She has a diagnosis of obstructive sleep apnea. 8) Skin Rash. She needs to have a biopsy to evaluate for an eczematous process versus an inflammatory process since it has not resolved while on methotrexate. The patient voiced understanding of the aforementioned assessment and plan. The patient has consented for synchronous (real-time) Telemedicine (audio-video technology) on 6/15/2020 for their care to be delivered over telemedicine in place of their regularly scheduled office visit pursuant to the emergency declaration under the [facility] waiver authority and the [employer] Act, this Virtual  Visit was conducted, with patient's consent, to reduce the patient's risk of exposure to COVID-19 and provide continuity of care for an established patient. Services were provided through a video synchronous discussion virtually to substitute for in-person clinic visit. TODAY'S ORDERS    No orders of the defined types were placed in this encounter. No future appointments.   Tami Sevilla MD, [address] [facility]    Adult Rheumatology   Rheumatology Ultrasound Certified  [address] [facility], [facility] [address]  [address] [facility], [address] [place] [address]   Phone [phone]  Fax [phone]

## 2020-07-08 RX ORDER — ERGOCALCIFEROL 1.25 MG/1
50000 CAPSULE ORAL
Qty: 12 CAP | Refills: 1 | Status: SHIPPED | OUTPATIENT
Start: 2020-07-09 | End: 2020-10-06

## 2020-10-06 RX ORDER — ERGOCALCIFEROL 1.25 MG/1
CAPSULE ORAL
Qty: 12 CAP | Refills: 0 | Status: SHIPPED | OUTPATIENT
Start: 2020-10-06 | End: 2020-11-02

## 2020-11-02 RX ORDER — ERGOCALCIFEROL 1.25 MG/1
CAPSULE ORAL
Qty: 12 CAP | Refills: 0 | Status: SHIPPED | OUTPATIENT
Start: 2020-11-02 | End: 2020-12-29

## 2020-12-29 RX ORDER — ERGOCALCIFEROL 1.25 MG/1
CAPSULE ORAL
Qty: 12 CAP | Refills: 0 | Status: SHIPPED | OUTPATIENT
Start: 2020-12-29 | End: 2021-02-02 | Stop reason: SDUPTHER

## 2021-01-19 PROBLEM — J45.20 MILD INTERMITTENT ASTHMA WITHOUT COMPLICATION: Status: ACTIVE | Noted: 2021-01-19

## 2021-01-22 PROBLEM — K76.0 FATTY LIVER: Chronic | Status: ACTIVE | Noted: 2021-01-22

## 2021-02-03 ENCOUNTER — VIRTUAL VISIT (OUTPATIENT)
Dept: RHEUMATOLOGY | Age: 36
End: 2021-02-03
Payer: COMMERCIAL

## 2021-02-03 DIAGNOSIS — E55.9 VITAMIN D DEFICIENCY: ICD-10-CM

## 2021-02-03 DIAGNOSIS — R76.0 LUPUS ANTICOAGULANT POSITIVE: ICD-10-CM

## 2021-02-03 DIAGNOSIS — M35.2 BEHCET'S DISEASE (HCC): Primary | ICD-10-CM

## 2021-02-03 PROCEDURE — 99215 OFFICE O/P EST HI 40 MIN: CPT | Performed by: INTERNAL MEDICINE

## 2021-02-03 RX ORDER — ERGOCALCIFEROL 1.25 MG/1
CAPSULE ORAL
Qty: 36 CAP | Refills: 5 | Status: SHIPPED | OUTPATIENT
Start: 2021-02-03 | End: 2022-01-24

## 2021-02-03 RX ORDER — ASPIRIN 81 MG/1
81 TABLET ORAL DAILY
Qty: 90 TAB | Refills: 5 | Status: SHIPPED | OUTPATIENT
Start: 2021-02-03 | End: 2021-12-28

## 2021-02-03 NOTE — PROGRESS NOTES
REASON FOR VISIT    This is a follow-up visit for Ms. Nikki Trotter for     ICD-10-CM   1. Behcet's syndrome [facility]) M35.2     The patient has consented for synchronous (real-time) Telemedicine (audio-video technology) on 2/3/2021 for their care to be delivered over telemedicine in place of their regularly scheduled office visit pursuant to the emergency declaration under the [address] [place] [address] [facility] authority and the [employer] Act, this Virtual  Visit was conducted, with patient's consent, to reduce the patient's risk of exposure to COVID-19 and provide continuity of care for an established patient. Services were provided through a video synchronous discussion virtually to substitute for in-person clinic visit. Immunosuppression Screening (8/21/2020):  Quantiferon TB: negative  PPD:  Not performed  Hepatitis B: negative  Hepatitis C: negative    Therapy History includes:  Current DMARD therapy include: none  Prior DMARD therapy include: methotrexate 7.5 to 15 mg weekly, Otezla (8/21/2019 to 10/2019; INSURANCE)  Discontinued DMARDs because of inefficacy: None  Discontinued DMARDs because of side effects: None  Contra-Indicated DMARDs because of pregnancy planning: methotrexate, leflunomide     HISTORY OF PRESENT ILLNESS    Ms. Nikki Trotter returns for a follow-up. On her last visit, I continued her off Gar [place] for Bechet's as she was planning on pregnancy, and had been off it since 10/2019 due to insurance issues without recurrence of her cristina-genital ulcers. She has interval pneumonia. She got . She is working on pregnancy. Today, she feels well. She no longer has recurrence of genital and oral ulcers. She reports intermittent rash on her hands and is following with dermatology who prescribed topical which responds well.     I reviewed her vitamin D level dated 1/20/2021 with her which showed 26.4. she had been taking ergocalciferol twice a week. She denies fever, weight loss, blurred vision, vision loss, oral ulcers, ankle swelling, dry cough, dyspnea, nausea, vomiting, dysphagia, abdominal pain, black or bloody stool, fall since last visit, rash, easy bruising and increased thirst.    Last toxicity monitoring by blood work was done on 12/01/2020 and did not reveal any significant adverse effects. The patient has had any interval hospital admissions, infections, or surgeries. REVIEW OF SYSTEMS    A comprehensive review of systems was performed and pertinent results are documented in the HPI, review of systems is otherwise non-contributory. PAST MEDICAL HISTORY    She has a past medical history of Alopecia, Anxiety, Asthma, Behcet's disease ([address] Depression, Fibromyalgia, GERD (gastroesophageal reflux disease), Hashimoto's thyroiditis, IBS (irritable bowel syndrome), Insomnia, Sleep apnea, Sleep apnea, Vascular abnormality, and Vitamin D deficiency. FAMILY HISTORY    Her family history includes Alcohol abuse in her maternal cousin; Anxiety in her paternal aunt; Arthritis in her mother; Asthma in her mother; Bipolar Disorder in her maternal cousin; Depression in her father; Diabetes in her father; Drug Abuse in her maternal cousin; Heart Disease in her father; Hypertension in her father; Other in her mother and paternal cousin. SOCIAL HISTORY    She reports that she has never smoked. She has never used smokeless tobacco. She reports current alcohol use. She reports that she does not use drugs. MEDICATIONS    Current Outpatient Medications   Medication Sig Dispense Refill    ergocalciferol (ERGOCALCIFEROL) 1,250 mcg (50,000 unit) capsule Take 1 capsule by mouth every Monday, Wednesday and Friday 36 Cap 5    aspirin delayed-release 81 mg tablet Take 1 Tab by mouth daily. 90 Tab 5    azithromycin (ZITHROMAX) 250 mg tablet Take 2 tablets the first day, then 1 tablet daily for 4 days.       cyclobenzaprine (FLEXERIL) 10 mg tablet Take 1 Tab by mouth three (3) times daily as needed for Muscle Spasm(s). 30 Tab 0    ibuprofen (MOTRIN) 600 mg tablet Take 1 Tab by mouth every six (6) hours as needed for Pain. 20 Tab 0    lidocaine 4 % patch Apply for 12 hours and remove for 12 hours 10 Patch 0    montelukast (SINGULAIR) 10 mg tablet Take 1 Tab by mouth daily. 90 Tab 1    albuterol (Ventolin HFA) 90 mcg/actuation inhaler Take 1-2 Puffs by inhalation two (2) times a day. 1 Inhaler 5    PNV Comb #2-Iron-FA-Omega 3 29-1-400 mg cmpk Take  by mouth.  albuterol (PROVENTIL VENTOLIN) 2.5 mg /3 mL (0.083 %) nebu 3 mL by Nebulization route every six (6) hours as needed for Wheezing, Shortness of Breath or Cough. 120 Nebule 0    Nebulizer & Compressor machine Use as directed for albuterol prn. 1 Each 0    augmented betamethasone dipropionate (DIPROLENE-AF) 0.05 % topical cream Apply  to affected area as needed for Skin Irritation.  Minoxidil (ROGAINE) 5 % foam by Apply Externally route two (2) times a day.  multivitamin (ONE A DAY) tablet Take 1 Tab by mouth daily.  levothyroxine (SYNTHROID) 200 mcg tablet Take 175 mcg by mouth daily.  loratadine (CLARITIN) 10 mg tablet Take 10 mg by mouth daily. ALLERGIES    No Known Allergies    PHYSICAL EXAMINATION    There were no vitals taken for this visit. There is no height or weight on file to calculate BMI. General: Patient is alert, oriented x 3, not in acute distress    HEENT:   Sclerae are not injected and appear moist.  There is no alopecia.  Neck is supple     Chest:  Breathing comfortably at room air    Extremities:  Free of clubbing, cyanosis, edema    Neurological exam:  Muscle strength is full in upper and lower extremities     Skin exam:    Previous exam    Facial rosacea  Erythema on left dorsum hand    Musculoskeletal exam:  A comprehensive musculoskeletal exam was performed for all joints of each upper and lower extremity and assessed for swelling, tenderness and range of motion. Positive results are documented as below:    Bilateral ankle tenderness    DATA REVIEW    Laboratory     Recent laboratory results were reviewed, summarized, and discussed with the patient. Imaging    Musculoskeletal Ultrasound    None    Radiographs    Chest 12/01/2020: focal opacities in the right lung base anteriorly and the left lung base posteriorly. . . The heart, mediastinum and pulmonary vasculature are normal.  The bony thorax is unremarkable for age    CT Imaging    CT Chest without contrast 1/22/2021: SUPRACLAVICULAR REGION: No supraclavicular adenopathy. MEDIASTINUM: No mass or lymphadenopathy. No hilar or mediastinal lymphadenopathy. No esophageal mass. No endotracheal or endobronchial mass. PLEURA/LUNGS[de-identified] No effusion or pneumothorax. Minimal scarring/atelectasis in the right middle lobe. No suspicious pulmonary mass or nodule. No evidence of pneumonia. There is no pleural or pericardial effusion. SOFT TISSUE/ AXILLA: No axillary adenopathy. No chest wall mass. BONES: Hepatic steatosis is severe. No lytic or blastic lesions. No evidence of fracture or dislocation. CTA Chest with and without contrast 12/01/2020: There was slightly less than optimal opacification of the pulmonary artery. No definite intraluminal filling defects are noted. Specifically, there is no definite evidence of central pulmonary embolic disease. Slightly prominent hilar lymph nodes are noted. There is abnormal alveolar opacity involving the posterior aspect of the left lower lobe as well as the medial segment of the right middle lobe. Air bronchograms are noted in these regions. This is suggestive of infiltration. There is decreased attenuation of the visualized portions of the liver. This is indicative of fatty infiltration. MR Imaging    None    DXA     None    ASSESSMENT AND PLAN    This is a follow-up visit for Ms. Melody Farmer.     1) Behcet's Disease. (oral ulcer, genital ulcers, synovitis, rash)    She had been under clinical remission on methotrexate 15 mg and then 7.5 mg and was previously following with Dr. Gaurav Stinson at [facility]. I discontinued it but she had recurrence of her oral and vaginal ulcers. She also had developed bilateral ankle arthritis. She is planning on pregnancy so I planned to avoid methotrexate. Clora Krista was FDA approved for Behcet's disease, which she took from 8/21/2019 to 10/2019 due to insurance issues. Fortunately, she has not had recurrence. Her Behcet is in remission. She is planning on pregnancy. If she were to resume Clora Krista, it should be stopped within a week of conception planning. 2) Positive Lupus Anticoagulant. Her confirm was 1.5 in 9/27/2016 and 7/12/2016. She is on aspirin 81 mg daily. She denies interval deep vein thrombosis. She does not have antiphospholipid syndrome based on revised the [place] classification criteria because there is no history of thrombosis or morbidity in pregnancy (fetal death at ten or more weeks gestation, unexplained fetal loss in three or more occasions before ten or more weeks gestation or premature birth before 34 weeks due to severe preeclampsia, eclampsia or placental insufficiency). I will continue aspirin 81 mg daily. She is not on hydroxychloroquine 400 mg daily. She is planing on pregnancy. I asked her to inform her ObGyn about this. I will check serologies. 3) Vitamin D Deficiency. Her vitamin D was 26.4 (previously 45.5, 23.9) on ergocalciferol 50,000 twice weekly. I will increase it to 3 times a week and asked her to have it repeated in 2 to 3 months. 4) Bilateral Trochanteric Bursitis with Iliotibial Band Syndrome. This was not an active issue today. I had referred her to physical therapy. 5) Musculoskeletal Neck Pain. This was not an active issue today. I had referred the patient to physical therapy. 6) Bilateral Patellofemoral Arthralgia. This was not an active issue today.  I had referred her to physical therapy. 7) Fibromyalgia. Her history, constellation of symptoms, and examination are consistent with a pain syndrome. She has a diagnosis of obstructive sleep apnea. 8) Skin Rash. She follows with dermatology with improvement with topicals. It did not resolved while on methotrexate. The patient voiced understanding of the aforementioned assessment and plan. The patient has consented for synchronous (real-time) Telemedicine (audio-video technology) on 2/3/2021 for their care to be delivered over telemedicine in place of their regularly scheduled office visit pursuant to the emergency declaration under the [facility], [facility] waiver authority and the [employer] Act, this Virtual  Visit was conducted, with patient's consent, to reduce the patient's risk of exposure to COVID-19 and provide continuity of care for an established patient. Services were provided through a video synchronous discussion virtually to substitute for in-person clinic visit.     TODAY'S ORDERS    Orders Placed This Encounter    ANTIPHOSPHOLIPID SYNDROME PANEL    DSDNA ANTIBODY BY IFA, CRITHIDIRUSS LUCILIAE, WITH REFLEX TO TITER    COMPLEMENT, C3 & C4    SMITH ANTIBODIES    T PALLIDUM SCREEN W/REFLEX    SJOGREN'S ABS, SSA AND SSB    PROTEIN ELECTROPHORESIS W/ REFLX ELIER    ANTI-NUCLEAR AB BY IFA (RDL)    COMPLEMENT, CH50, TOTAL    DIRECT RUDI    ergocalciferol (ERGOCALCIFEROL) 1,250 mcg (50,000 unit) capsule    aspirin delayed-release 81 mg tablet     Future Appointments   Date Time Provider Richi Arriola   2/3/2022  8:20 AM Kina Shipley MD [facility] BS AMB     Jackie Childress MD, [address] [place] [address]    Adult Rheumatology   Rheumatology Ultrasound Certified  [facility]  [facility] of [facility], [address] [place] [address] Road   Phone [phone]  Fax [phone]

## 2021-06-10 ENCOUNTER — HOSPITAL ENCOUNTER (OUTPATIENT)
Dept: LAB | Age: 36
Discharge: HOME | End: 2021-06-10

## 2021-06-10 DIAGNOSIS — M35.2 BEHCET'S DISEASE (HCC): ICD-10-CM

## 2021-06-10 DIAGNOSIS — Z79.60 LONG-TERM USE OF IMMUNOSUPPRESSANT MEDICATION: Primary | ICD-10-CM

## 2021-06-16 DIAGNOSIS — J30.9 CHRONIC ALLERGIC RHINITIS: ICD-10-CM

## 2021-06-16 DIAGNOSIS — J18.9 PNEUMONIA OF RIGHT MIDDLE LOBE DUE TO INFECTIOUS ORGANISM: ICD-10-CM

## 2021-06-16 RX ORDER — MONTELUKAST SODIUM 10 MG/1
10 TABLET ORAL DAILY
Qty: 90 TABLET | Refills: 1 | OUTPATIENT
Start: 2021-06-16

## 2021-06-16 NOTE — TELEPHONE ENCOUNTER
----- Message from Phani Emery sent at 6/16/2021  9:49 AM EDT -----  Regarding: Dr. Frances New  Medication Refill    Caller (if not patient): N/A      Relationship of caller (if not patient): N/A      Best contact number(s): [phone]      Name of medication and dosage if known: Montelukast 10mg      Is patient out of this medication (yes/no): Yes      Pharmacy name: [address] [place] [address] listed in chart? (yes/no): Yes  Pharmacy phone number: N/A      Details to clarify the request:       Phani Emery

## 2021-06-16 NOTE — TELEPHONE ENCOUNTER
Patient requesting refill on     Requested Prescriptions     Pending Prescriptions Disp Refills    montelukast (SINGULAIR) 10 mg tablet 90 Tablet 1     Sig: Take 1 Tablet by mouth daily.          Last OV 5/13/2020

## 2021-06-22 LAB
ALBUMIN SERPL ELPH-MCNC: 3.4 G/DL (ref 2.9–4.4)
ALBUMIN/GLOB SERPL: 0.9 {RATIO} (ref 0.7–1.7)
ALPHA1 GLOB SERPL ELPH-MCNC: 0.2 G/DL (ref 0–0.4)
ALPHA2 GLOB SERPL ELPH-MCNC: 1 G/DL (ref 0.4–1)
ANA HOMOGEN TITR SER: ABNORMAL {TITER}
ANA SER QL IF: POSITIVE
ANA SPECKLED TITR SER: ABNORMAL {TITER}
B-GLOBULIN SERPL ELPH-MCNC: 1.1 G/DL (ref 0.7–1.3)
C3 SERPL-MCNC: 170 MG/DL (ref 82–167)
C4 SERPL-MCNC: 45 MG/DL (ref 12–38)
CH50 SERPL-ACNC: >60 U/ML
DAT POLY-SP REAG RBC QL: NEGATIVE
DSDNA AB SER QL CLIF: NEGATIVE
ENA SM AB SER-ACNC: <0.2 AI (ref 0–0.9)
ENA SS-A AB SER-ACNC: 0.2 AI (ref 0–0.9)
ENA SS-B AB SER-ACNC: <0.2 AI (ref 0–0.9)
GAMMA GLOB SERPL ELPH-MCNC: 1.4 G/DL (ref 0.4–1.8)
GLOBULIN SER CALC-MCNC: 3.7 G/DL (ref 2.2–3.9)
M PROTEIN SERPL ELPH-MCNC: NORMAL G/DL
NOTE, 018286: ABNORMAL
PLEASE NOTE, 011150: NORMAL
PROT PATTERN SERPL ELPH-IMP: NORMAL
PROT SERPL-MCNC: 7.1 G/DL (ref 6–8.5)
TREPONEMA PALLIDUM IGG+IGM AB [PRESENCE] IN SERUM OR PLASMA BY IMMUNOASSAY: NON REACTIVE

## 2021-06-22 NOTE — PROGRESS NOTES
The results were reviewed. Positive JASON IFA RDL 1:160 (homogenous), 1:80 (speckled). Anti-phospholipid labs not drawn. All remaining labs are normal/negative.

## 2021-06-25 LAB
CHLAMYDIA, EXTERNAL: NEGATIVE
HBSAG, EXTERNAL: NEGATIVE
HEPATITIS C AB,   EXT: NEGATIVE
HIV, EXTERNAL: NEGATIVE
N. GONORRHEA, EXTERNAL: NEGATIVE
RUBELLA, EXTERNAL: NORMAL
T. PALLIDUM, EXTERNAL: NEGATIVE
TYPE, ABO & RH, EXTERNAL: NORMAL

## 2021-12-27 DIAGNOSIS — R76.0 LUPUS ANTICOAGULANT POSITIVE: ICD-10-CM

## 2021-12-28 RX ORDER — ASPIRIN 81 MG/1
TABLET ORAL
Qty: 90 TABLET | Refills: 4 | Status: SHIPPED | OUTPATIENT
Start: 2021-12-28 | End: 2022-01-24

## 2022-01-14 LAB — GRBS, EXTERNAL: NEGATIVE

## 2022-01-21 ENCOUNTER — ANESTHESIA (OUTPATIENT)
Dept: LABOR AND DELIVERY | Age: 37
End: 2022-01-21
Payer: COMMERCIAL

## 2022-01-21 ENCOUNTER — ANESTHESIA EVENT (OUTPATIENT)
Dept: LABOR AND DELIVERY | Age: 37
End: 2022-01-21
Payer: COMMERCIAL

## 2022-01-21 ENCOUNTER — HOSPITAL ENCOUNTER (INPATIENT)
Age: 37
LOS: 3 days | Discharge: HOME | End: 2022-01-24
Attending: OBSTETRICS & GYNECOLOGY | Admitting: OBSTETRICS & GYNECOLOGY
Payer: COMMERCIAL

## 2022-01-21 DIAGNOSIS — G89.18 POSTOPERATIVE PAIN: Primary | ICD-10-CM

## 2022-01-21 LAB
ABO + RH BLD: NORMAL
ALBUMIN SERPL-MCNC: 2.9 G/DL (ref 3.5–5)
ALBUMIN/GLOB SERPL: 0.7 {RATIO} (ref 1.1–2.2)
ALP SERPL-CCNC: 127 U/L (ref 45–117)
ALT SERPL-CCNC: 15 U/L (ref 12–78)
ANION GAP SERPL CALC-SCNC: 7 MMOL/L (ref 5–15)
AST SERPL-CCNC: 18 U/L (ref 15–37)
BILIRUB SERPL-MCNC: 0.2 MG/DL (ref 0.2–1)
BLOOD GROUP ANTIBODIES SERPL: NORMAL
BUN SERPL-MCNC: 12 MG/DL (ref 6–20)
BUN/CREAT SERPL: 20 (ref 12–20)
CALCIUM SERPL-MCNC: 9 MG/DL (ref 8.5–10.1)
CHLORIDE SERPL-SCNC: 105 MMOL/L (ref 97–108)
CO2 SERPL-SCNC: 23 MMOL/L (ref 21–32)
CREAT SERPL-MCNC: 0.6 MG/DL (ref 0.55–1.02)
CREAT UR-MCNC: 128 MG/DL
ERYTHROCYTE [DISTWIDTH] IN BLOOD BY AUTOMATED COUNT: 13.6 % (ref 11.5–14.5)
GLOBULIN SER CALC-MCNC: 4 G/DL (ref 2–4)
GLUCOSE SERPL-MCNC: 72 MG/DL (ref 65–100)
HCT VFR BLD AUTO: 34.5 % (ref 35–47)
HGB BLD-MCNC: 10.9 G/DL (ref 11.5–16)
MCH RBC QN AUTO: 29.8 PG (ref 26–34)
MCHC RBC AUTO-ENTMCNC: 31.6 G/DL (ref 30–36.5)
MCV RBC AUTO: 94.3 FL (ref 80–99)
NRBC # BLD: 0 K/UL (ref 0–0.01)
NRBC BLD-RTO: 0 PER 100 WBC
PLATELET # BLD AUTO: 263 K/UL (ref 150–400)
PMV BLD AUTO: 9.9 FL (ref 8.9–12.9)
POTASSIUM SERPL-SCNC: 4.2 MMOL/L (ref 3.5–5.1)
PROT SERPL-MCNC: 6.9 G/DL (ref 6.4–8.2)
PROT UR-MCNC: 29 MG/DL (ref 0–11.9)
PROT/CREAT UR-RTO: 0.2
RBC # BLD AUTO: 3.66 M/UL (ref 3.8–5.2)
SODIUM SERPL-SCNC: 135 MMOL/L (ref 136–145)
SPECIMEN EXP DATE BLD: NORMAL
WBC # BLD AUTO: 8.8 K/UL (ref 3.6–11)

## 2022-01-21 PROCEDURE — 74011250636 HC RX REV CODE- 250/636: Performed by: OBSTETRICS & GYNECOLOGY

## 2022-01-21 PROCEDURE — 75410000003 HC RECOV DEL/VAG/CSECN EA 0.5 HR: Performed by: OBSTETRICS & GYNECOLOGY

## 2022-01-21 PROCEDURE — 74011250637 HC RX REV CODE- 250/637: Performed by: OBSTETRICS & GYNECOLOGY

## 2022-01-21 PROCEDURE — 84156 ASSAY OF PROTEIN URINE: CPT

## 2022-01-21 PROCEDURE — 85027 COMPLETE CBC AUTOMATED: CPT

## 2022-01-21 PROCEDURE — 77030007866 HC KT SPN ANES BBMI -B: Performed by: ANESTHESIOLOGY

## 2022-01-21 PROCEDURE — 86900 BLOOD TYPING SEROLOGIC ABO: CPT

## 2022-01-21 PROCEDURE — 65410000002 HC RM PRIVATE OB

## 2022-01-21 PROCEDURE — 74011000258 HC RX REV CODE- 258: Performed by: OBSTETRICS & GYNECOLOGY

## 2022-01-21 PROCEDURE — 76010000391 HC C SECN FIRST 1 HR: Performed by: OBSTETRICS & GYNECOLOGY

## 2022-01-21 PROCEDURE — 4A1HXCZ MONITORING OF PRODUCTS OF CONCEPTION, CARDIAC RATE, EXTERNAL APPROACH: ICD-10-PCS | Performed by: OBSTETRICS & GYNECOLOGY

## 2022-01-21 PROCEDURE — 80053 COMPREHEN METABOLIC PANEL: CPT

## 2022-01-21 PROCEDURE — 74011250636 HC RX REV CODE- 250/636: Performed by: ANESTHESIOLOGY

## 2022-01-21 PROCEDURE — 76060000078 HC EPIDURAL ANESTHESIA: Performed by: OBSTETRICS & GYNECOLOGY

## 2022-01-21 PROCEDURE — 74011250636 HC RX REV CODE- 250/636: Performed by: NURSE ANESTHETIST, CERTIFIED REGISTERED

## 2022-01-21 PROCEDURE — 74011000250 HC RX REV CODE- 250: Performed by: ANESTHESIOLOGY

## 2022-01-21 PROCEDURE — 76010000392 HC C SECN EA ADDL 0.5 HR: Performed by: OBSTETRICS & GYNECOLOGY

## 2022-01-21 PROCEDURE — 36415 COLL VENOUS BLD VENIPUNCTURE: CPT

## 2022-01-21 PROCEDURE — 74011250637 HC RX REV CODE- 250/637: Performed by: ADVANCED PRACTICE MIDWIFE

## 2022-01-21 RX ORDER — SODIUM CHLORIDE, SODIUM LACTATE, POTASSIUM CHLORIDE, CALCIUM CHLORIDE 600; 310; 30; 20 MG/100ML; MG/100ML; MG/100ML; MG/100ML
125 INJECTION, SOLUTION INTRAVENOUS CONTINUOUS
Status: DISCONTINUED | OUTPATIENT
Start: 2022-01-21 | End: 2022-01-24 | Stop reason: HOSPADM

## 2022-01-21 RX ORDER — SERTRALINE HYDROCHLORIDE 50 MG/1
TABLET, FILM COATED ORAL DAILY
COMMUNITY
End: 2022-04-05 | Stop reason: SDUPTHER

## 2022-01-21 RX ORDER — OXYTOCIN/RINGER'S LACTATE 30/500 ML
10 PLASTIC BAG, INJECTION (ML) INTRAVENOUS AS NEEDED
Status: DISCONTINUED | OUTPATIENT
Start: 2022-01-21 | End: 2022-01-24 | Stop reason: HOSPADM

## 2022-01-21 RX ORDER — SERTRALINE HYDROCHLORIDE 50 MG/1
50 TABLET, FILM COATED ORAL DAILY
Status: DISCONTINUED | OUTPATIENT
Start: 2022-01-22 | End: 2022-01-24 | Stop reason: HOSPADM

## 2022-01-21 RX ORDER — IBUPROFEN 400 MG/1
800 TABLET ORAL EVERY 8 HOURS
Status: DISCONTINUED | OUTPATIENT
Start: 2022-01-21 | End: 2022-01-24 | Stop reason: HOSPADM

## 2022-01-21 RX ORDER — MORPHINE SULFATE 10 MG/ML
10 INJECTION, SOLUTION INTRAMUSCULAR; INTRAVENOUS
Status: DISCONTINUED | OUTPATIENT
Start: 2022-01-21 | End: 2022-01-24 | Stop reason: HOSPADM

## 2022-01-21 RX ORDER — PHENYLEPHRINE HCL IN 0.9% NACL 0.4MG/10ML
SYRINGE (ML) INTRAVENOUS AS NEEDED
Status: DISCONTINUED | OUTPATIENT
Start: 2022-01-21 | End: 2022-01-24 | Stop reason: HOSPADM

## 2022-01-21 RX ORDER — OMEPRAZOLE 10 MG/1
10 CAPSULE, DELAYED RELEASE ORAL DAILY
COMMUNITY
End: 2022-01-24

## 2022-01-21 RX ORDER — KETOROLAC TROMETHAMINE 30 MG/ML
30 INJECTION, SOLUTION INTRAMUSCULAR; INTRAVENOUS
Status: DISPENSED | OUTPATIENT
Start: 2022-01-21 | End: 2022-01-22

## 2022-01-21 RX ORDER — OXYTOCIN/RINGER'S LACTATE 30/500 ML
PLASTIC BAG, INJECTION (ML) INTRAVENOUS
Status: DISPENSED
Start: 2022-01-21 | End: 2022-01-22

## 2022-01-21 RX ORDER — SODIUM CHLORIDE 9 MG/ML
250 INJECTION, SOLUTION INTRAVENOUS AS NEEDED
Status: CANCELLED | OUTPATIENT
Start: 2022-01-21

## 2022-01-21 RX ORDER — SODIUM CHLORIDE 0.9 % (FLUSH) 0.9 %
5-40 SYRINGE (ML) INJECTION EVERY 8 HOURS
Status: DISCONTINUED | OUTPATIENT
Start: 2022-01-21 | End: 2022-01-24 | Stop reason: HOSPADM

## 2022-01-21 RX ORDER — PANTOPRAZOLE SODIUM 20 MG/1
20 TABLET, DELAYED RELEASE ORAL
Status: DISCONTINUED | OUTPATIENT
Start: 2022-01-22 | End: 2022-01-22

## 2022-01-21 RX ORDER — SIMETHICONE 80 MG
80 TABLET,CHEWABLE ORAL
Status: DISCONTINUED | OUTPATIENT
Start: 2022-01-21 | End: 2022-01-24 | Stop reason: HOSPADM

## 2022-01-21 RX ORDER — MONTELUKAST SODIUM 10 MG/1
10 TABLET ORAL
Status: DISCONTINUED | OUTPATIENT
Start: 2022-01-21 | End: 2022-01-24 | Stop reason: HOSPADM

## 2022-01-21 RX ORDER — METFORMIN HYDROCHLORIDE 500 MG/1
2000 TABLET ORAL
Status: DISCONTINUED | OUTPATIENT
Start: 2022-01-21 | End: 2022-01-24 | Stop reason: HOSPADM

## 2022-01-21 RX ORDER — CETIRIZINE HCL 10 MG
10 TABLET ORAL DAILY
Status: DISCONTINUED | OUTPATIENT
Start: 2022-01-22 | End: 2022-01-24 | Stop reason: HOSPADM

## 2022-01-21 RX ORDER — OXYTOCIN/RINGER'S LACTATE 30/500 ML
87.3 PLASTIC BAG, INJECTION (ML) INTRAVENOUS AS NEEDED
Status: DISCONTINUED | OUTPATIENT
Start: 2022-01-21 | End: 2022-01-24 | Stop reason: HOSPADM

## 2022-01-21 RX ORDER — BUPIVACAINE HYDROCHLORIDE 7.5 MG/ML
INJECTION, SOLUTION INTRASPINAL
Status: COMPLETED | OUTPATIENT
Start: 2022-01-21 | End: 2022-01-21

## 2022-01-21 RX ORDER — NALOXONE HYDROCHLORIDE 0.4 MG/ML
0.4 INJECTION, SOLUTION INTRAMUSCULAR; INTRAVENOUS; SUBCUTANEOUS AS NEEDED
Status: DISCONTINUED | OUTPATIENT
Start: 2022-01-21 | End: 2022-01-24 | Stop reason: HOSPADM

## 2022-01-21 RX ORDER — ONDANSETRON 2 MG/ML
4 INJECTION INTRAMUSCULAR; INTRAVENOUS
Status: DISCONTINUED | OUTPATIENT
Start: 2022-01-21 | End: 2022-01-24 | Stop reason: HOSPADM

## 2022-01-21 RX ORDER — AMMONIA 15 % (W/V)
1 AMPUL (EA) INHALATION AS NEEDED
Status: DISCONTINUED | OUTPATIENT
Start: 2022-01-21 | End: 2022-01-24 | Stop reason: HOSPADM

## 2022-01-21 RX ORDER — ENOXAPARIN SODIUM 100 MG/ML
40 INJECTION SUBCUTANEOUS EVERY 24 HOURS
Status: DISCONTINUED | OUTPATIENT
Start: 2022-01-22 | End: 2022-01-24 | Stop reason: HOSPADM

## 2022-01-21 RX ORDER — DOCUSATE SODIUM 100 MG/1
100 CAPSULE, LIQUID FILLED ORAL
Status: DISCONTINUED | OUTPATIENT
Start: 2022-01-21 | End: 2022-01-24 | Stop reason: HOSPADM

## 2022-01-21 RX ORDER — SODIUM CHLORIDE, SODIUM LACTATE, POTASSIUM CHLORIDE, CALCIUM CHLORIDE 600; 310; 30; 20 MG/100ML; MG/100ML; MG/100ML; MG/100ML
INJECTION, SOLUTION INTRAVENOUS
Status: DISCONTINUED | OUTPATIENT
Start: 2022-01-21 | End: 2022-01-24 | Stop reason: HOSPADM

## 2022-01-21 RX ORDER — CALCIUM POLYCARBOPHIL 625 MG
1 TABLET ORAL
Status: DISCONTINUED | OUTPATIENT
Start: 2022-01-21 | End: 2022-01-24 | Stop reason: HOSPADM

## 2022-01-21 RX ORDER — HYDROCORTISONE 1 %
CREAM (GRAM) TOPICAL AS NEEDED
Status: DISCONTINUED | OUTPATIENT
Start: 2022-01-21 | End: 2022-01-24 | Stop reason: HOSPADM

## 2022-01-21 RX ORDER — ONDANSETRON 2 MG/ML
INJECTION INTRAMUSCULAR; INTRAVENOUS AS NEEDED
Status: DISCONTINUED | OUTPATIENT
Start: 2022-01-21 | End: 2022-01-24 | Stop reason: HOSPADM

## 2022-01-21 RX ORDER — MORPHINE SULFATE 10 MG/ML
6 INJECTION, SOLUTION INTRAMUSCULAR; INTRAVENOUS
Status: DISCONTINUED | OUTPATIENT
Start: 2022-01-21 | End: 2022-01-24 | Stop reason: HOSPADM

## 2022-01-21 RX ORDER — DIPHENHYDRAMINE HYDROCHLORIDE 50 MG/ML
25 INJECTION, SOLUTION INTRAMUSCULAR; INTRAVENOUS
Status: DISCONTINUED | OUTPATIENT
Start: 2022-01-21 | End: 2022-01-24 | Stop reason: HOSPADM

## 2022-01-21 RX ORDER — OXYCODONE AND ACETAMINOPHEN 5; 325 MG/1; MG/1
1 TABLET ORAL
Status: DISCONTINUED | OUTPATIENT
Start: 2022-01-21 | End: 2022-01-24 | Stop reason: HOSPADM

## 2022-01-21 RX ORDER — OXYTOCIN/RINGER'S LACTATE 20/1000 ML
PLASTIC BAG, INJECTION (ML) INTRAVENOUS
Status: DISCONTINUED | OUTPATIENT
Start: 2022-01-21 | End: 2022-01-24 | Stop reason: HOSPADM

## 2022-01-21 RX ORDER — MORPHINE SULFATE 1 MG/ML
INJECTION, SOLUTION EPIDURAL; INTRATHECAL; INTRAVENOUS
Status: COMPLETED | OUTPATIENT
Start: 2022-01-21 | End: 2022-01-21

## 2022-01-21 RX ORDER — SODIUM CHLORIDE 0.9 % (FLUSH) 0.9 %
5-40 SYRINGE (ML) INJECTION AS NEEDED
Status: DISCONTINUED | OUTPATIENT
Start: 2022-01-21 | End: 2022-01-24 | Stop reason: HOSPADM

## 2022-01-21 RX ADMIN — SODIUM CHLORIDE, POTASSIUM CHLORIDE, SODIUM LACTATE AND CALCIUM CHLORIDE: 600; 310; 30; 20 INJECTION, SOLUTION INTRAVENOUS at 14:16

## 2022-01-21 RX ADMIN — CALCIUM POLYCARBOPHIL 625 MG: 625 TABLET, FILM COATED ORAL at 23:20

## 2022-01-21 RX ADMIN — Medication 909 ML/HR: at 14:52

## 2022-01-21 RX ADMIN — BUPIVACAINE HYDROCHLORIDE IN DEXTROSE 15 MG: 7.5 INJECTION, SOLUTION SUBARACHNOID at 14:26

## 2022-01-21 RX ADMIN — ONDANSETRON HYDROCHLORIDE 4 MG: 2 INJECTION, SOLUTION INTRAMUSCULAR; INTRAVENOUS at 14:30

## 2022-01-21 RX ADMIN — Medication 120 MCG: at 14:34

## 2022-01-21 RX ADMIN — MORPHINE SULFATE 0.2 MG: 1 INJECTION EPIDURAL; INTRATHECAL; INTRAVENOUS at 14:26

## 2022-01-21 RX ADMIN — MONTELUKAST 10 MG: 10 TABLET, FILM COATED ORAL at 23:20

## 2022-01-21 RX ADMIN — METFORMIN HYDROCHLORIDE 2000 MG: 500 TABLET ORAL at 21:55

## 2022-01-21 RX ADMIN — CEFAZOLIN 3 G: 10 INJECTION, POWDER, FOR SOLUTION INTRAVENOUS at 13:59

## 2022-01-21 RX ADMIN — SODIUM CHLORIDE, POTASSIUM CHLORIDE, SODIUM LACTATE AND CALCIUM CHLORIDE 1000 ML: 600; 310; 30; 20 INJECTION, SOLUTION INTRAVENOUS at 13:15

## 2022-01-21 RX ADMIN — SODIUM CHLORIDE 40 MCG/MIN: 9 INJECTION, SOLUTION INTRAVENOUS at 14:19

## 2022-01-21 RX ADMIN — SODIUM CHLORIDE, POTASSIUM CHLORIDE, SODIUM LACTATE AND CALCIUM CHLORIDE 125 ML/HR: 600; 310; 30; 20 INJECTION, SOLUTION INTRAVENOUS at 19:35

## 2022-01-21 RX ADMIN — KETOROLAC TROMETHAMINE 30 MG: 30 INJECTION, SOLUTION INTRAMUSCULAR; INTRAVENOUS at 21:56

## 2022-01-21 NOTE — PROGRESS NOTES
1300 G 1 P 0 pt of Dr Erica Tavares for Dr Elisa Choudhury admitted to labor and delivery # 8 for scheduled  section. Pt has history of Chiari malformation and surgical repair. Pt has gestational diabetes. On insulin and gestational hypertension  1305 Abd clipper prep and HCG wipes  1310 Gama hose applied  1315 IV started. Labs obtained and sent to lab  1359 Ancef 3 gm IV  1545 Bedside and Verbal shift change report given to SAULO Bah RN (oncoming nurse) by Timoteo Kee RN (offgoing nurse). Report included the following information SBAR, Kardex, Procedure Summary, Intake/Output, MAR, Accordion and Recent Results.

## 2022-01-21 NOTE — L&D DELIVERY NOTE
Delivery Summary  Patient: Umm Randolph             Circumcision:   declines  Additional Delivery Comments - 1LTCS for due to hx Arnold Chiari Malformation and recommendation not to push per surgeon. GHTN. Morbid obesity. NELSON dressing placed.     Information for the patient's :  Faraz Thurman [[id number]]     Delivery Type: , Low Transverse   Delivery Date: 2022   Delivery Time: 2:51 PM     Birth Weight:       Sex:  male  Delivery Clinician:  Edi Barry   Gestational Age: 42w4d    Presentation: Vertex   Position:             Apgars were 9  and 9      Resuscitation Method: None     Meconium Stained: None    Living Status: Living       Placenta Date/Time: 2022  2:54 PM   Placenta Removal: Expressed   Placenta Appearance: Normal;Intact    Cord Information: 3 Vessels    Cord Events: None       Disposition of Cord Blood:      Blood Gases Sent?:          Cord pH:  none    Episiotomy: None   Laceration(s): None     Estimated Blood Loss (ml): No data found 750mL    Labor Events  Method: None      Augmentation: None   Cervical Ripening:     None        Operative Vaginal Delivery - none

## 2022-01-21 NOTE — PROGRESS NOTES
1545~  Bedside and Verbal shift change report given to Ronn Farmer RN (oncoming nurse) by Sirena Galloway RN (offgoing nurse). Report included the following information SBAR, Intake/Output, MAR and Recent Results     1745~  TRANSFER - OUT REPORT:    Verbal report given to EILEEN Parish RN(name) on Jenny Hines  being transferred to MIU(unit) for routine progression of care       Report consisted of patients Situation, Background, Assessment and   Recommendations(SBAR). Information from the following report(s) SBAR, Intake/Output, MAR and Recent Results was reviewed with the receiving nurse. Lines:   Peripheral IV 01/21/22 Left Hand (Active)        Opportunity for questions and clarification was provided. Patient transported with:   Registered Nurse        .

## 2022-01-21 NOTE — ANESTHESIA PREPROCEDURE EVALUATION
Relevant Problems   RESPIRATORY SYSTEM   (+) Mild intermittent asthma without complication      NEUROLOGY   (+) Anxiety disorder   (+) Major depressive disorder with single episode, in full remission ([facility] [address]      CARDIOVASCULAR   (+) Hypertension, essential      GASTROINTESTINAL   (+) Fatty liver      ENDOCRINE   (+) Acquired hypothyroidism   (+) Obesity, morbid (HCC)       Anesthetic History   No history of anesthetic complications            Review of Systems / Medical History  Patient summary reviewed, nursing notes reviewed and pertinent labs reviewed    Pulmonary        Sleep apnea    Asthma        Neuro/Psych         Psychiatric history     Cardiovascular    Hypertension                   GI/Hepatic/Renal     GERD           Endo/Other    Diabetes: type 2  Hypothyroidism  Morbid obesity     Other Findings              Physical Exam    Airway  Mallampati: II  TM Distance: > 6 cm  Neck ROM: normal range of motion   Mouth opening: Normal     Cardiovascular  Regular rate and rhythm,  S1 and S2 normal,  no murmur, click, rub, or gallop             Dental  No notable dental hx       Pulmonary  Breath sounds clear to auscultation               Abdominal  GI exam deferred       Other Findings            Anesthetic Plan    ASA: 3  Anesthesia type: spinal      Post-op pain plan if not by surgeon: intrathecal opiates      Anesthetic plan and risks discussed with: Patient

## 2022-01-21 NOTE — ANESTHESIA PROCEDURE NOTES
Spinal Block    Start time: 1/21/2022 2:23 PM  End time: 1/21/2022 2:26 PM  Performed by: Zora Dimas MD  Authorized by: Zora Dimas MD     Pre-procedure:   Indications: at surgeon's request and post-op pain management  Preanesthetic Checklist: patient identified, risks and benefits discussed, anesthesia consent, site marked, patient being monitored and timeout performed      Spinal Block:   Patient Position:  Seated  Prep Region:  Lumbar  Prep: Betadine      Location:  L2-3  Technique:  Single shot        Needle:   Needle Type:  Pencan  Needle Gauge:  24 G  Attempts:  1      Events: CSF confirmed, no blood with aspiration and no paresthesia        Assessment:  Insertion:  Uncomplicated  Patient tolerance:  Patient tolerated the procedure well with no immediate complications

## 2022-01-21 NOTE — ROUTINE PROCESS
TRANSFER - IN REPORT:    Verbal report received from TIMOTHY Torres RN(name) on Jenny Kinseyocks  being received from L&D(unit) for routine progression of care      Report consisted of patients Situation, Background, Assessment and   Recommendations(SBAR). Information from the following report(s) SBAR was reviewed with the receiving nurse. Opportunity for questions and clarification was provided. Assessment completed upon patients arrival to unit and care assumed.

## 2022-01-21 NOTE — H&P
[facility] [place]  Jeff Fontenot 32, [address]  Phone: [phone], Fax: [phone]  Date: 2022    Pregnancy Problems:   Gravid uterus large-for-dates - 32w: 96%,, 36w 99%   Gestational diabetes mellitus - persistant high fasting despite diet change and metformin,, start insulin,, refused diabetic teaching 35w A1c: 5.4   Pregnancy-induced hypertension - PIH v. hypertension v. anxiety induced,, Baseline labs normal, 24 h urine prot: 154 mg/24h   Anemia - Start: 2021 - 28w: hgb 10.8 hct 32.3   Anxiety   Primigravida - has birth plan: wants vaginal swab and nasal innocultation, considering cord blood banking   Insulin resistance - type B - taking metfomin, started checking sugar level FS, 1h pp,, fasting bs elevated at 30w, diet change ___,, consider pp GTT ___   Family history of autism - cousin with possible Aspergers   Advanced maternal age  - 28, declines Genetic counseling, MAT21 nL (would terminate w/ abnormal), declines amniocentesis   Hypothyroidism - Endocrinologist: Dr. Kalee Esparza [facility], Nl except for TPO 75 (h), levothyroxine incr 50mcg  by her endocrinologist,, monthly thyroid labs ___,, decrease to 175 mcg after delivery per her endocrinologist: Rx given by endocrinology to patient @ 30w   Chiari malformation - found during her rheumatology w/u and s/p decompression surgery,, surgeon recommended primary c/s ___   Body mass index 40+ - severely obese - BMI: 46,, early glucola nL,, asa 81mg _X_ ,, FS w/ mfm w/ echo heart not well seen,, Ped Card: nL ECHO ,, GSq4w ___,, fetal surveillance wkly 32wk (per MFM) ___,, consider delvery by 40 wks ___   Fibromyalgia   Lupus anticoagulant disorder - during autoimmune w/u suggestion lupus anticoag present, repeat testing here (-)   Asthma - need rescue inhaler   Autoimmune disease - Rheumatologist: Dr. Priscila Figueroa (or Chary Espino). Clotholden Silva Clotholden Silva Behcet disease rash & genital ulcers for her tx'd with hydrocortisone well,, assoc. with Complications included miscarriages, caesarean section, medical termination of pregnancy, HELLP syndrome, DVT (if h/o outside of pregnancy) and immune thrombocytopenia,, Lupus anticoag (+) then (-) on repeat testing,, ASA 81mg _x_   Vitamin D deficiency - no deficiency at bookin   Anxiety disorder - w/ depression in the past, anxiety more dominant now, sertraline helping,,    MAT21 XY  History of Present Illness:  Pt is a 40 yo G1 at 37+1 presenting for delivery in the setting of GHTN. Pregnancy complicated by hx of Chiari Malformation for which a CS was recommended. Pt is also severely obese with a BMI of >50. Pt also has significant anxiety but takes zoloft. The patient presents today for a visit related to her prenatal care. Concerns: ____    She denies vaginal bleeding, contractions, discharge, leaking, and decreased fetal movement. Pt is a 40 yo G1 at 37+1 presenting for delivery in the setting of GHTN. Pregnancy complicated by hx of Chiari Malformation for which a CS was recommended. Pt is also severely obese with a BMI of >50 and has insulin resistance for which she is on metformin and insulin. Pt also has significant anxiety but takes zoloft. She denies vaginal bleeding, contractions, discharge, leaking, and decreased fetal movement. Assessment/Plan  1. Gestation period, 37 weeks -  Admit to L&D.  NPO.  EFM/[place].  Preop abx per protocol. COVID test  To OR for RLTCS.  Z3A.37: 37 weeks gestation of pregnancy    2. Pregnancy-induced hypertension -  GHTN by BPs in office. Will get pre-eclampsia labs here. Mg not indicated at this time. Will monitor closely for need for PP BP medications. O13.3: Gestational [pregnancy-induced] hypertension without significant proteinuria, third trimester    3. Chiari malformation -  Will plan for 1LTCS per surgery  Q07.00:  Arnold-Chiari syndrome without spina bifida or hydrocephalus    4. Gestational diabetes mellitus -  Was on insulin. Will go back to pre-pregnancy metformin regimen after delivery. Will need 2hr GTT at 6 week appt  O24.414: Gestational diabetes mellitus in pregnancy, insulin controlled    5. Gravid uterus large-for-dates -  36w EFW 99%  N29.97W3: Maternal care for excessive fetal growth, third trimester, fetus 1    6. Anemia -  CBC today. J08.251: Anemia complicating pregnancy, third trimester    7. Autoimmune disease -  Behcet's disease  M35.9: Systemic involvement of connective tissue, unspecified    8. Body mass index 40+ - severely obese -  Plan NELSON dressing. U82.173: Obesity complicating pregnancy, third trimester    9. High risk pregnancy  O09.93: Supervision of high risk pregnancy, unspecified, third trimester    10. Hypothyroidism -  Plan 175 mcg postpartum  E03.9: Hypothyroidism, unspecified    11. Anxiety -  Plan zoloft postpartum. F41.9: Anxiety disorder, unspecified    12. Advanced maternal age  -  MT 24 WNL  Fetal echo WNL. O200.65: Supervision of elderly primigravida, third trimester      Return to Office  Patient will return to the office as needed  Prenatal Flowsheet:  Fundus Pres FHR FM PLS Cervix Exam BP Wt Edema Glucose Protein Leukocytes Nitrite Ketones Blood   2021     [email or web address]                                        2021   7 wks 1 days   [email or web address]                             130/82 344 lbs          Comments: Thyroid labs ___,, Lorelei Daltonon ___,, SMA/CF? ___,, Has she spoken with rheumatology about her Behcet's?___ Rm 2 AMA after discussion of T218, CF/SMA declines Genetic counseling, considering Lorelei Thompson, (would probably terminate w/ certain findings), declines amniocentesis Patient is exposed to cat litter- changes litter box.  discussed Behcet disease in pregnancy, encouraged her to talk with her rheumatologist, bw at rheumatologist did not repeat her lupus anticoagulant testing, will repeat here today, discussed behcet's assoc. with Complications included miscarriages, caesarean section, medical termination of pregnancy, HELLP syndrome and immune thrombocytopenia discussed her Chiari malformation: found during her rheumatology w/u and s/p decompression surgery,, surgeon recommended primary c/s reviewed thyroid doctor's bw, will get TT3 and TPO today that was not in the endocrinologist's labs asthma generally does not bother her, need rescue inhaler <monthly, no daily meds, only a problem with allergy season take the montelukast and loratadine due to allergies, particularly their cat, will try coming off the montelukast, but if do poorly well will call in a refill for her Anxiety doing well on present dosage of sertraline, will call with problem discussed risks of obesity in pregnancy including preeclampsia, stillbirth, poor fetal growth TBD site for delivery/breast/prob minipill, hope to get pregnant as soon as after her delivery   07/12/2021   9 wks 4 days   [email or web address]                       9 cm  170    13302/ 342 lbs  none neg       Comments: Rm 2 OB Problem - presents for bright red bleeding. Believe since she called earlier she's had poss. psychosomatic stomach cramps v. sx of her chronic GI issues, Has seen bleeding twice today. She thinks that she may have scratched herself. Bleeding has stopped. No pelvic pain: no blood in vagina or vulvar, u/s showed viable porter IUP, no obvious source of her bleeding. patient reassured, call with further bleeding.   07/14/2021     [email or web address]                                        07/29/2021   12 wks   [email or web address]                       12 wks  154 No   130/88 342 lbs none none 1+       Comments: early glucola/MAT21/SMA/CF/TPA today, c/o daily nausea, B6 helps some, declines Rx. .. Trula Blew Trula Blew Trula Blew no further bleeding. .. want to deliver at [facility]   08/26/2021   16 wks   [email or web address]                       16 cm  143 No   120/86 348 lbs none         Comments: Rm 1 -had really sharp pain in lower abdomen yesterday after coughing- the pain continued all day, pain in mid abdomen, better today, not her typical IBS pain, she has had some constipation for the last couple of days, and on further discussion she feels pain due to constipation, no vaginal bleeding, no vaginal symptoms: discussed fiber, activity and water: upper abd tender to palpation, -notes still throwing up and when she does will break vessels in her face. -feels thyroid is working well -will check thyroid levels & afp today -otherwise doing well   09/02/2021     [email or web address]                                        10/05/2021   21 wks 5 days   [email or web address]                         150 No   128/78 354 lbs trace none neg       Comments: next appt: thyroid labs ___,, Finger stick blood sugars ___ ,, further thoughts on her c/s date ___,, how is anxiety ?___. .. Minh Silva Rm 1 It's a boy, circ info given: discussed circumcision, flu shot today - discussed her recent u/s and her f/u u/s end of the month - Pediatric cardiologist this friday due to poor visualization of the heart - states she has a cough that is causing her nausea, she notes that the cough is a combination of allergy and asthma, after discussion Rx sent in - want C/S on 2/5, but this is a saturday, discussed needed to be monday through [address], one week before her due date, she will get back with me - started new dosage of 250mcg levothyroxine per her endocrinologist on 9/3 - fasting BS mid 90's to 100's, pp nL, but after dinner not recorded, she will work on getting more of after dinner sugars, discussed bed time snack, if not better in a week she will call and we will probably change her metformin discussed already treating her as a diabetic, so no need at this time for the 28 wk 1 hr glucola - birth plan discussed: wants vaginal swab and nasal inoculation, considering cord blood banking: discussed private v. public and need to have supplies at delivery, sometimes can't get cord blood, and need to send off immediately   10/13/2021   22 wks 6 days   [email or web address]                             108/78 357 lbs          Comments: OB problem visit with c/o milky then bloody nipple discharge and pain on right yesterday. First felt pressure and tenderness on right nipple then expressed white fluid followed by blood-tinged fluid. Right breast was larger than left yesterday; swelling has gone down. She states is concerned about blocked duct vs. mastitis. First occurrence. Breast exam normal, nipples bilaterally normal in appearance; no drainage on gentle expression. No blotchy areas, tense swelling, or breast pain. Discussed onset of colostrum usually around 17 weeks and not unusual to have creamy/yellowish nipple discharge during pregnancy; milk blebs common. Viewed her phone photos which show colostrum when she expressed, then pinkness afterwards. Mild flu-like symptoms started Monday, felt feverish, slight sore throat, runny nose. States she skipped her allergy meds one day and sometimes this happens. Temp is 96.8 today in office. Nose and throat symptoms improved today. Encouraged to let us know about s/s mastitis; sent breastfeeding education to her portal. Discussed follow up with possible culture of fluid, sono if needed, and abx. if suspicious of mastitis. Declines FHTs today. RTO as scheduled for US with [facility] 10/22.   10/26/2021   24 wks 5 days   [email or web address]                       26 cm  147 Yes   130/72 360 lbs trace none neg       Comments: GS___, thyroid labs___, 3rd trimester testing & probably not glucola since taking metformin and checking her sugars ___ next appt. Rm 2 HAPPY BIRTHDAY - Still struggling with n/v, discussed meds but declined due to episodes far apart and pass after vomitting - persistent cough, feels mucous in her lungs, multiple negative covid tests: after discussion will see her pulmonologist w/ her h/o pneumonia earlier in the year, feel different from in the past when due to GERD :encouraged her to see her pulmonologist - did not bring glucose log today, though notes fasting 85 and will be like that if have snack at night, note that she grazes due to her nausea and so very difficult getting 1h pp blood sugars, will try to check her sugars fasting and after a different meal each day and will fax to us in 2 wks, will check A1c today - thyroid testing today - does not see her rheumatologist due to distance - allergist: told her she was allergic to everything and to take her monoleukast and albuterol inhaler, encouraged her to continue this otherwise doing well   2021   28 wks 5 days   [email or web address]                         155 Yes   140/80  134/80 367 lbs  none 1+       Comments: Rm 2 30w 4d 1630g 96% levi 20.8 - will do 3rd T labs & thyroid labs/a1c w/o glucola - endocrinologist did not change her thyroid dosing, felt she was in a good range after seeing her labs - did not send in or bring in sugars due to on vacation and mildly elevated fasting to 100 a couple of times per patient, she then lost her glucometer and just recently found the device, she will send in sugars next week - wants breast pump, considering Tdap-will let me know, watched CBR video: plan to do, still thinking about the particulars - c/o breast being itchy: moisturize, - 22 requested for the  section - initial bp elevated due to throwing up she believes denies headache, visual changes, ruq abdominal pain, chest pain or shortness of breath: nL exam, baseline labs today, discussed preeclampsia precautions, questions answered - n&v unchanged - has not seen pulmonologist yet due to him booking way out, feels lung symptoms slightly better - to get covid booster on friday, discussed tylenol with fever otherwise doing well   2021   30 wks 5 days   [email or web address]                       30 cm  154 Yes   132/78 364 lbs trace         Comments: next appt Rm 1 - booster  Moderna: did well - vit D discussed she stated that I told her to stop her Vit D and I countered that I told her she should stop her Vit D 50,000 units, but can take 2000 u/d, She stated that I did not, I apologized for the miscommunication - Discussed Fe intake, she is taking and doing ok - N&V unchanged - fasting BS 97 to 108, discussed adding insulin, she would like to try restricting her carb and stopping her white flour intake, will reeval at f/u, she notes that just prior to getting pregnant testing for DM was (-), she does have a strong family h/o dm and that her A1c was normal 11/23, discussed as pregnancy progressed she is probably developing GDM as suggested by her fasting blood sugars, discussed 2h GTT after Delivery would be suggested, she suggested that she would consider - discussed her seeing her pulmonologist prior to surgery, she will work on making that happen, offered we could find her one - Parents hospitalized: mom out after diverticulitis, dad still in hospital with MRSA: to get 6 wks antibiotics as outpatient, thinks his pig valve is infected with MRSA and still in a workup, she states that they will getting together for [place] and want to know if she can: stated that she can if they keep their distance and he has had 2 wks of antibiotics. ... later that night did more reading and was suggested that he go through a decolonization procedure of her father with with chlorhexidine shower, chlorhexidine mouthwash and mupirocin nasal swabbing for 5 days and I discussed this with her by phone, also suggested they still have distance and for her dad to talk with 's prior to discharge to get their opinion on safety of her being around him after discharge, she had no questions after - to see her thyroid doctor on 12/9 otherwise doing well   12/09/2021     [email or web address]                                        12/17/2021   32 wks 1 days   [email or web address]                        Vertex 150 Yes   126/80 369 lbs trace none neg       Comments: bpp & review sugars next appt. ___. .. Lawernce Roughen Lawernce Roughen Rm 2 34w4d 2458g 96% levi 14.8 bpp 8/10 (-2 breathing) - she states her endocrinologist doesn't understand why we keep sending him the thyroid labs, he tod her he wants her running low and that she does not need any further thyroid testing, discussed that obstetricly with elevated TPO that we should check her thyroid labs and we send in the labs so that he is in the loop and if felt change necessary - information on drawing up insulin and giving insulin giving & demonstrated use of the syringe and where to give, continue to decline her teaching here at the office, have not started insulin and so sugars not reviewed today, she will send in her sugars early next week, starting tonight with her insulin   12/22/2021   32 wks 6 days   [email or web address]                        Vertex 143 Yes   130/70 367 lbs trace none trace       Comments: Rm 2 PP 8/8 levi: 13.9 - fasting blood sugar better but not normal with addition of insulin to her metformin, discussed adding a complex carbohydrate to her night time snack v. going up on insulin, after discussion , will increase to 18 u if not normal after 3 days of adding the complex carbohydrate. Discussed since blood sugar after meals normal, ok to decrease finger sticks to daily fasting and then 1 h pp after a different single each day, trying to get 2 meals/wk checked as long as they are normal too.  Discussed risks to pregnancy by her gestational diabetes - no symptoms of Behcet disease since being pregnant otherwise doing well   12/30/2021   34 wks   [email or web address]                        Vertex 150 Yes   150/80  138/80 373 lbs none none trace       Comments: Rm 2 bpp 8/8 - did not bring sugars, did go up to 18 u NPH, most recent fasting 80's, other sugars normal, checking 2 sugars a day, one being fasting - stressful due to her father coming out of surgery that he had 55 53 chance of surving, extubated recently, in the ICU, stress and rushed coming in - repeat BP nL, elevated bp appears to be due to stress, no preeclamptic symptoms, benign exam - discussed [facility] dept not doing car seat checks due to covid precautions   01/07/2022   35 wks 1 days   [email or web address]                        Vertex 135 Yes   130/80 376 lbs none         Comments: bp checked at end of appt. ___,, Remind schedule Covid testing,, GS ___ & GBS ___ next appt . Rosa Camargo ..Rm 2 - pt did not bring sugars, reports fasting bs 95 to low 100's, increased to 20 u NPH 2 days ago, bs 95 this am, asked her to increase to 24 u tonight, will check A1c today, encouraged her to bring in sugars - father still doing poorly in the ICU, she is very stressed by this - discussed labor symptoms - discussed postop care - she plans to get lactation consult via [employer] otherwise doing well   01/14/2022   36 wks 1 days   [email or web address]                        Vertex 141 Yes   158/88  134/88 377 lbs trace none trace       Comments: bpp w/ ua dopplers ___,, discuss GBS ___,, bs ___,, rash on chest ___,, discuss thyroid med plan pp ___Rm 2 - US BPP 8/8 levi 14.1 GS 38w 6d 3682g >99% reassuring cord dopplers discussed findings GBS today no urine - Diabetes: running low on bs strips so only fasting done from day and running backwards: 127(notes that she didn't eat last night), 83, 102, 98, 96, 88 discussed increasing her insulin, did have a normal A1c at last appt. , after discussion she declines change in insulin which is reasonable and she will work on diet, and with new batch of strips will get pp once a day also at a different meal each day - BP initially elevated, she stated it was due to her getting up and moving around, on repeat bp was upper limits normal. Denies headache, visual changes, RUQ abdominal pain, chest pain or shortness of breath: recommended preeclampsia labs: patient refused due to causing too much stress, discussed risks of eclampsia, encouraged checking bp at home and if elevated to let us know with her partner, preeclampsia precautions given - notes rash on her left upper chest, no where else, itchy, burned with 1% hydrocortisone cream, she feels a stress rash and no obvious topical cause: discussed using an ointment steroid from OTC, offered sending in Rx, declined, discussed benadryl oral and cream, if worsen, not better or other concerns call   2022   36 wks 6 days   [email or web address]                          Yes     none         Comments: VV: New to [place]. Presents for surgical consult. Pt is scheduled for a primary  on 2022. Would like to discuss the general procedure, COVID protocol, recovery, and preparation for a . Wondering if she should take a stool softener prior to . States she is interested in vaginal swab for the baby. Pt unable to obtain weight and bp today under quarantine-  + , pt neg on rapid, awaiting PCR. *discussed w patient that given her BPs, I would rec delivery at [address] [place] [address], and given all of her risk factors, I would rec delivery at [facility]. she claims she is not very high risk. I explained that I would discuss w Dr. Burleson Given and get back to her, but my recommendation based on what I see in her chart, is delivery at [address] [facility], at [facility]. Discussed w Dr. Burleson Given and he will discuss plan further with patient. 2022   37 wks 1 days   [email or web address]                         144 Yes   150/100 378 lbs trace none trace       Comments: Rm 2 bpp 8/8 levi 9.6 ua dopplers reassuring elevated bp, Denies headache, visual changes, RUQ abdominal pain, chest pain or shortness of breath: benign exam, preeclampsia precautions given. Discussed her bp could be chronic htn (and that chronic htn can incr. risk of preeclampsia) that is starting to show now v. stress induced and that some feel this could be a early manifestation of chronic htn v. gestational hypertension and recommendations of delivery at 37wk. Discussed if nothing done could progress to preeclampsia or ecclampsia with risks of death, stillbirth, seizure, stroke.  questions answered, after discussion will go to L&D for delivery, discussed with L&D and debra doctor, she is on her way to L&D   01/21/2022     aiyana                                        Allergies: Allergies not reviewed (last reviewed 01/14/2022)     NKDA   Medications:  Reviewed Medications     albuterol sulfate HFA 90 mcg/actuation aerosol inhaler  INHALE 1 TO 2 PUFFS BY MOUTH TWICE DAILY 04/26/21   filled    Alcohol Prep Pads  use prior to checking her blood sugars: fasting and 1 hr after meals 12/13/21   prescribed    ferrous sulfate 325 mg (65 mg iron) tablet  Take 1 tablet(s) every day by oral route as directed. 11/29/21   prescribed    fiber 06/03/20   entered    HumuLIN N NPH U-100 Insulin (isophane susp) 100 unit/mL subcutaneous  Inject 18 unit(s) every day by subcutaneous route at dinner for 30 days. 12/30/21   prescribed    InnoSpire Elegance device  USE AS DIRECTED 11/30/20   filled    levothyroxine 175 mcg tablet  Take 1 tablet(s) every day by oral route. 12/09/21   prescribed    levothyroxine 200 mcg tablet  Take 1 tablet(s) every day by oral route. 08/03/21   prescribed    levothyroxine 50 mcg tablet  Take 1 tablet(s) every day by oral route. 09/02/21   prescribed    loratadine 10 mg tablet 11/25/14   entered    metFORMIN  mg tablet,extended release 24 hr  Take 4 tablets by mouth daily at dinner. 12/01/21   renewed    montelukast 10 mg tablet  Take 1 tablet(s) every day by oral route for 90 days. 10/05/21   prescribed    Prenatal 01/20/21   entered    sertraline 50 mg tablet  TAKE 1/2 TABLET BY MOUTH EVERY DAY FOR 6 DAYS, THEN TAKE 1 TABLET EVERY DAY 08/09/21   changed      pro/pre biotic  Vital Signs:  None recorded  Problems:  Reviewed Problems     Behcet's syndrome - Onset: 04/28/2017   Hashimoto thyroiditis   Disorder of thyroid gland - Onset: 04/28/2017 - flow sheet do not delete   Hyperinsulinar obesity - Onset: 01/24/2021 - insulin 54.    Vitamin D deficiency - no deficiency at bookin   Hyperlipidemia - Onset: 2021 - tg 426. hdl 31.    Obesity - Onset: 2021   Anxiety   Asthma - need rescue inhaler <monthly, no daily meds, only a problem with allergy season, coughing more: will restart singulair   Non-alcoholic fatty liver - Onset: 2021   Rosacea - Onset: 2021   Chiari malformation - found during her rheumatology w/u and s/p decompression surgery,, surgeon recommended primary c/s ___   Sleep apnea - on cpap   Fatigue - Onset: 2021   Elevated C-reactive protein - Onset: 2021 - 13   Pregnant - Onset: 2021   Serum ferritin high - Onset: 2021 - 175   Serum triglycerides raised - Onset: 2021 - 426  Past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

## 2022-01-21 NOTE — OP NOTES
Operative Note    Name: Iqra Chau [facility] Record Number: [id number]   YOB: 1985  Today's Date: 2022      Pre-operative Diagnosis: PRIMARY C/S CHIARI MALFORMATION    Post-operative Diagnosis: PRIMARY C/S CHIARI MALFORMATION    Operation: low transverse  section Procedure(s):   SECTION    Surgeon(s):  MD Edgardo Longoria MD    Anesthesia: Spinal    Prophylactic Antibiotics: Ancef  DVT Prophylaxis: Sequential Compression Devices         Fetal Description: porter     Birth Information:   Information for the patient's :  Anthony Joel [[id number]]   Delivery of a   male infant on 2022 at 2:51 PM. Apgars were 9  and 9 . Umbilical Cord: 3 Vessels     Umbilical Cord Events: None     Placenta: Expressed removal with Normal;Intact appearance. Amniotic Fluid Volume: Moderate     Amniotic Fluid Description:  Clear        Umbilical Cord: 3 vessels present    Placenta:  expressed    Estimated Blood Loss (ml):      Specimens: None           Complications:  none    Procedure Detail:      After proper patient identification and consent, the patient was taken to the operating room, where spinal anesthesia was administered and found to be adequate. Prieto catheter had been placed using sterile technique. The patient was prepped and draped in the normal sterile fashion. The abdomen was entered using the Pfannenstiel technique and carried down to the fascia. The fascia was incised sharply and extended laterally. The inferior fascial edge was grasped with two Kocher clamps and sharply dissected off of the rectus muscles. The superior aspect of the fascial edge was then grasped with two Kochers and and the superior rectus was sharply dissected off of the fascial edge. Following this the rectus muscles were  in the midline.  The peritoneum was entered bluntly well superior to the bladder without any apparent injury and stretched bilaterally. An Troy retractor was placed. The bladder flap was created without difficulty. A low transverse uterine incision was made with the scalpel and extended superiorly and inferiorly with blunt finger dissection. Amniotomy was performed and the fluid was medium amount clear. The babys head was then delivered atraumatically. The nose and mouth were suctioned. The cord was clamped and cut and the baby was handed off to Nursing staff in attendance. Placenta was then removed from the uterus. The uterus was curettaged with a dry lap pad three times and cleared of all clots and debris. The uterine incision was closed with 0 monocryl, double layer  in running locking fashion for the first layer and non-locking for the second layer with good hemostasis assured. Good hemostasis was again reassured. The paracolic gutters were cleaned with moist laparotomy pads and good hemostasis was again reassured throughout. The peritoneum was  reapproximated with 2-0 vicryl. Surgicel was placed over the rectus. A small rent in the fascia was noted that was repaired with a figure of eight of 0 vicryl. The fascia was closed with 0 Vicryl in a running fashion. Good hemostasis was assured. Surgicel was placed over the fascial closure in the subcutaneous space. The subcutaneous tissue was closed with 2-0 plain. The skin was closed with a subcutaneous staple closure and a NELSON dressing was placed. The patient tolerated the procedure well. Sponge, lap, and needle counts were correct times three and the patient and baby were taken to recovery/postpartum room in stable condition.     Abisai Orr MD  January 21, 2022  3:27 PM

## 2022-01-22 LAB
BASOPHILS # BLD: 0 K/UL (ref 0–0.1)
BASOPHILS NFR BLD: 0 % (ref 0–1)
DIFFERENTIAL METHOD BLD: ABNORMAL
EOSINOPHIL # BLD: 0.2 K/UL (ref 0–0.4)
EOSINOPHIL NFR BLD: 2 % (ref 0–7)
ERYTHROCYTE [DISTWIDTH] IN BLOOD BY AUTOMATED COUNT: 14 % (ref 11.5–14.5)
HCT VFR BLD AUTO: 29.2 % (ref 35–47)
HGB BLD-MCNC: 9.3 G/DL (ref 11.5–16)
IMM GRANULOCYTES # BLD AUTO: 0 K/UL (ref 0–0.04)
IMM GRANULOCYTES NFR BLD AUTO: 0 % (ref 0–0.5)
LYMPHOCYTES # BLD: 1.5 K/UL (ref 0.8–3.5)
LYMPHOCYTES NFR BLD: 21 % (ref 12–49)
MCH RBC QN AUTO: 30.2 PG (ref 26–34)
MCHC RBC AUTO-ENTMCNC: 31.8 G/DL (ref 30–36.5)
MCV RBC AUTO: 94.8 FL (ref 80–99)
MONOCYTES # BLD: 0.5 K/UL (ref 0–1)
MONOCYTES NFR BLD: 6 % (ref 5–13)
NEUTS SEG # BLD: 5.1 K/UL (ref 1.8–8)
NEUTS SEG NFR BLD: 71 % (ref 32–75)
NRBC # BLD: 0 K/UL (ref 0–0.01)
NRBC BLD-RTO: 0 PER 100 WBC
PLATELET # BLD AUTO: 198 K/UL (ref 150–400)
PMV BLD AUTO: 9.8 FL (ref 8.9–12.9)
RBC # BLD AUTO: 3.08 M/UL (ref 3.8–5.2)
WBC # BLD AUTO: 7.3 K/UL (ref 3.6–11)

## 2022-01-22 PROCEDURE — 74011250636 HC RX REV CODE- 250/636: Performed by: ANESTHESIOLOGY

## 2022-01-22 PROCEDURE — 36415 COLL VENOUS BLD VENIPUNCTURE: CPT

## 2022-01-22 PROCEDURE — 85025 COMPLETE CBC W/AUTO DIFF WBC: CPT

## 2022-01-22 PROCEDURE — 74011250637 HC RX REV CODE- 250/637: Performed by: OBSTETRICS & GYNECOLOGY

## 2022-01-22 PROCEDURE — 74011250636 HC RX REV CODE- 250/636: Performed by: OBSTETRICS & GYNECOLOGY

## 2022-01-22 PROCEDURE — 74011250637 HC RX REV CODE- 250/637: Performed by: ADVANCED PRACTICE MIDWIFE

## 2022-01-22 PROCEDURE — 65410000002 HC RM PRIVATE OB

## 2022-01-22 RX ORDER — PANTOPRAZOLE SODIUM 20 MG/1
20 TABLET, DELAYED RELEASE ORAL DAILY
Status: DISCONTINUED | OUTPATIENT
Start: 2022-01-22 | End: 2022-01-24 | Stop reason: HOSPADM

## 2022-01-22 RX ADMIN — CETIRIZINE HYDROCHLORIDE 10 MG: 10 TABLET, FILM COATED ORAL at 09:17

## 2022-01-22 RX ADMIN — IBUPROFEN 800 MG: 400 TABLET, FILM COATED ORAL at 21:14

## 2022-01-22 RX ADMIN — OXYCODONE AND ACETAMINOPHEN 1 TABLET: 5; 325 TABLET ORAL at 18:23

## 2022-01-22 RX ADMIN — PANTOPRAZOLE SODIUM 20 MG: 20 TABLET, DELAYED RELEASE ORAL at 09:17

## 2022-01-22 RX ADMIN — SIMETHICONE 80 MG: 80 TABLET, CHEWABLE ORAL at 21:40

## 2022-01-22 RX ADMIN — METFORMIN HYDROCHLORIDE 2000 MG: 500 TABLET ORAL at 21:14

## 2022-01-22 RX ADMIN — KETOROLAC TROMETHAMINE 30 MG: 30 INJECTION, SOLUTION INTRAMUSCULAR; INTRAVENOUS at 13:03

## 2022-01-22 RX ADMIN — MONTELUKAST 10 MG: 10 TABLET, FILM COATED ORAL at 21:14

## 2022-01-22 RX ADMIN — ENOXAPARIN SODIUM 40 MG: 100 INJECTION SUBCUTANEOUS at 09:17

## 2022-01-22 RX ADMIN — CALCIUM POLYCARBOPHIL 625 MG: 625 TABLET, FILM COATED ORAL at 21:14

## 2022-01-22 RX ADMIN — DOCUSATE SODIUM 100 MG: 100 CAPSULE ORAL at 18:23

## 2022-01-22 RX ADMIN — SERTRALINE 50 MG: 50 TABLET, FILM COATED ORAL at 09:17

## 2022-01-22 RX ADMIN — KETOROLAC TROMETHAMINE 30 MG: 30 INJECTION, SOLUTION INTRAMUSCULAR; INTRAVENOUS at 03:51

## 2022-01-22 RX ADMIN — LEVOTHYROXINE SODIUM 175 MCG: 0.15 TABLET ORAL at 09:16

## 2022-01-22 NOTE — LACTATION NOTE
This note was copied from a baby's chart. Baby nursing well today,  deep latch obtained in football hold with pillows to elevate breast and baby to eye level, mother is comfortable and was able to latch baby a few times with only positioning/pillow assist, baby feeding vigorously with rhythmic suck, swallow, breathe pattern, audible swallowing, and evident milk transfer, both breasts offered, baby is asleep following feeding. Father of baby supportive. Mother has abundant colostrum today, having cramping during feed, baby audibly swallowing.

## 2022-01-22 NOTE — LACTATION NOTE
This note was copied from a baby's chart. Initial Lactation Consultation - Baby born by  this afternoon to a  mom at 40 1/7 weeks gestation. Mom has a history of insulin dependent gestational diabetes, hypothyroidism that is well maintained with medication and chronic allergies that she takes two medications for. She had insulin resistance before her pregnancy and takes metformin. Mom did notice some breast changes during her pregnancy and has been able to express drops of colostrum. Mom states baby latched and sucked a minute or two in labor and delivery. I helped mom with a feeding this evening. Baby's blood sugar was 53. Baby was rooting and we were able to get him latched in the prone position. Baby was sucking rhythmically with some swallowing noted. Feeding Plan: Mother will keep baby skin to skin as often as possible, feed on demand, respond to feeding cues, obtain latch, listen for audible swallowing, be aware of signs of oxytocin release/ cramping, thirst and sleepiness while breastfeeding. Mom will not limit the time the baby is at the breast. She will allow the baby to completely finish one breast and then offer the second breast at each feeding.

## 2022-01-22 NOTE — ROUTINE PROCESS
Bedside shift change report given to KATH Haro RN (oncoming nurse) by Devora Sainz RN (offgoing nurse). Report included the following information SBAR, Intake/Output and MAR.

## 2022-01-22 NOTE — ROUTINE PROCESS
Bedside shift change report given to Luz Pelayo RN (oncoming nurse) by Alex Hall RN (offgoing nurse). Report included the following information SBAR.

## 2022-01-22 NOTE — PROGRESS NOTES
Bedside and Verbal shift change report given to Alexandru Brumfield RN (oncoming nurse) by Jorge Alberto Conti RN (offgoing nurse). Report included the following information SBAR.

## 2022-01-22 NOTE — PROGRESS NOTES
Post-Operative  Day 1    Jenny Randolph     Assessment: Post-Op day 1, stable    Plan:     - Routine post-operative care. - N/A - declines circ  - Postop hemoglobin 9.3 (10.9). Plan to start Fe at discharge if pt anemic.  - Ambulate today.    - GHTN - BPs normal since delivery  - hypothyroid - on synthroid  - anxiety - zoloft  - BMI>50 - lovenox, NELSON dressing  - allergies - on zyrtec  - GDM - insulin during pregnancy, on metformin prior to pregnancy- restarted  - asthma - singulair          Information for the patient's :  Maida Rhodes [[id number]]   , Low Transverse     Patient doing well without significant complaint. Tolerating diet. Prieto out. Ambulating. Passing flatus. No pain. Vitals:  Visit Vitals  /75 (BP 1 Location: Right arm, BP Patient Position: At rest)   Pulse 91   Temp 98.5 °F (36.9 °C)   Resp 16   Ht 5' 7\" (1.702 m)   Wt (!) 171.5 kg (378 lb)   SpO2 94%   Breastfeeding No   BMI 59.20 kg/m²     Temp (24hrs), Av.4 °F (36.9 °C), Min:97.7 °F (36.5 °C), Max:99.2 °F (37.3 °C)      Last 24hr Input/Output:    Intake/Output Summary (Last 24 hours) at 2022 0748  Last data filed at 2022 0410  Gross per 24 hour   Intake 500 ml   Output 1705 ml   Net -1205 ml          Exam:     Patient without distress. Fundus firm, nontender limited by habitus. Incision bandaged, clean, dry, intact. Perineum with normal lochia noted per nursing assessment. Lower extremities are negative for pathological edema.     Labs:   Lab Results   Component Value Date/Time    WBC 7.3 2022 04:00 AM    WBC 8.8 2022 01:41 PM    WBC 7.8 2020 11:17 AM    WBC 5.5 2019 11:40 AM    WBC 4.9 2019 10:06 AM    HGB 9.3 (L) 2022 04:00 AM    HGB 10.9 (L) 2022 01:41 PM    HGB 12.1 2020 11:17 AM    HGB 12.6 2019 11:40 AM    HGB 13.6 2019 10:06 AM    HCT 29.2 (L) 2022 04:00 AM    HCT 34.5 (L) 01/21/2022 01:41 PM    HCT 36.0 12/01/2020 11:17 AM    HCT 37.3 05/31/2019 11:40 AM    HCT 41.3 02/21/2019 10:06 AM    PLATELET 920 64/72/0402 04:00 AM    PLATELET 568 08/80/8933 01:41 PM    PLATELET 634 99/58/8370 11:17 AM    PLATELET 796 67/81/8641 11:40 AM    PLATELET 595 29/57/6890 10:06 AM       Recent Results (from the past 24 hour(s))   TYPE & SCREEN    Collection Time: 01/21/22  1:29 PM   Result Value Ref Range    Crossmatch Expiration 01/24/2022,2359     ABO/Rh(D) A POSITIVE     Antibody screen NEG    CBC W/O DIFF    Collection Time: 01/21/22  1:41 PM   Result Value Ref Range    WBC 8.8 3.6 - 11.0 K/uL    RBC 3.66 (L) 3.80 - 5.20 M/uL    HGB 10.9 (L) 11.5 - 16.0 g/dL    HCT 34.5 (L) 35.0 - 47.0 %    MCV 94.3 80.0 - 99.0 FL    MCH 29.8 26.0 - 34.0 PG    MCHC 31.6 30.0 - 36.5 g/dL    RDW 13.6 11.5 - 14.5 %    PLATELET 458 531 - 962 K/uL    MPV 9.9 8.9 - 12.9 FL    NRBC 0.0 0  WBC    ABSOLUTE NRBC 0.00 0.00 - 1.95 K/uL   METABOLIC PANEL, COMPREHENSIVE    Collection Time: 01/21/22  1:41 PM   Result Value Ref Range    Sodium 135 (L) 136 - 145 mmol/L    Potassium 4.2 3.5 - 5.1 mmol/L    Chloride 105 97 - 108 mmol/L    CO2 23 21 - 32 mmol/L    Anion gap 7 5 - 15 mmol/L    Glucose 72 65 - 100 mg/dL    BUN 12 6 - 20 MG/DL    Creatinine 0.60 0.55 - 1.02 MG/DL    BUN/Creatinine ratio 20 12 - 20      GFR est AA >60 >60 ml/min/1.73m2    GFR est non-AA >60 >60 ml/min/1.73m2    Calcium 9.0 8.5 - 10.1 MG/DL    Bilirubin, total 0.2 0.2 - 1.0 MG/DL    ALT (SGPT) 15 12 - 78 U/L    AST (SGOT) 18 15 - 37 U/L    Alk. phosphatase 127 (H) 45 - 117 U/L    Protein, total 6.9 6.4 - 8.2 g/dL    Albumin 2.9 (L) 3.5 - 5.0 g/dL    Globulin 4.0 2.0 - 4.0 g/dL    A-G Ratio 0.7 (L) 1.1 - 2.2     PROTEIN/CREATININE RATIO, URINE    Collection Time: 01/21/22  3:55 PM   Result Value Ref Range    Protein, urine random 29 (H) 0.0 - 11.9 mg/dL    Creatinine, urine 128.00 mg/dL    Protein/Creat.  urine Ratio 0.2     CBC WITH AUTOMATED DIFF Collection Time: 01/22/22  4:00 AM   Result Value Ref Range    WBC 7.3 3.6 - 11.0 K/uL    RBC 3.08 (L) 3.80 - 5.20 M/uL    HGB 9.3 (L) 11.5 - 16.0 g/dL    HCT 29.2 (L) 35.0 - 47.0 %    MCV 94.8 80.0 - 99.0 FL    MCH 30.2 26.0 - 34.0 PG    MCHC 31.8 30.0 - 36.5 g/dL    RDW 14.0 11.5 - 14.5 %    PLATELET 422 005 - 416 K/uL    MPV 9.8 8.9 - 12.9 FL    NRBC 0.0 0  WBC    ABSOLUTE NRBC 0.00 0.00 - 0.01 K/uL    NEUTROPHILS 71 32 - 75 %    LYMPHOCYTES 21 12 - 49 %    MONOCYTES 6 5 - 13 %    EOSINOPHILS 2 0 - 7 %    BASOPHILS 0 0 - 1 %    IMMATURE GRANULOCYTES 0 0.0 - 0.5 %    ABS. NEUTROPHILS 5.1 1.8 - 8.0 K/UL    ABS. LYMPHOCYTES 1.5 0.8 - 3.5 K/UL    ABS. MONOCYTES 0.5 0.0 - 1.0 K/UL    ABS. EOSINOPHILS 0.2 0.0 - 0.4 K/UL    ABS. BASOPHILS 0.0 0.0 - 0.1 K/UL    ABS. IMM.  GRANS. 0.0 0.00 - 0.04 K/UL    DF AUTOMATED

## 2022-01-23 PROCEDURE — 74011250636 HC RX REV CODE- 250/636: Performed by: OBSTETRICS & GYNECOLOGY

## 2022-01-23 PROCEDURE — 74011250637 HC RX REV CODE- 250/637: Performed by: ADVANCED PRACTICE MIDWIFE

## 2022-01-23 PROCEDURE — 65410000002 HC RM PRIVATE OB

## 2022-01-23 PROCEDURE — 74011250637 HC RX REV CODE- 250/637: Performed by: OBSTETRICS & GYNECOLOGY

## 2022-01-23 RX ADMIN — OXYCODONE AND ACETAMINOPHEN 1 TABLET: 5; 325 TABLET ORAL at 23:13

## 2022-01-23 RX ADMIN — CALCIUM POLYCARBOPHIL 625 MG: 625 TABLET, FILM COATED ORAL at 21:10

## 2022-01-23 RX ADMIN — IBUPROFEN 800 MG: 400 TABLET, FILM COATED ORAL at 05:23

## 2022-01-23 RX ADMIN — CETIRIZINE HYDROCHLORIDE 10 MG: 10 TABLET, FILM COATED ORAL at 09:36

## 2022-01-23 RX ADMIN — OXYCODONE AND ACETAMINOPHEN 1 TABLET: 5; 325 TABLET ORAL at 18:35

## 2022-01-23 RX ADMIN — OXYCODONE AND ACETAMINOPHEN 1 TABLET: 5; 325 TABLET ORAL at 10:08

## 2022-01-23 RX ADMIN — ENOXAPARIN SODIUM 40 MG: 100 INJECTION SUBCUTANEOUS at 09:49

## 2022-01-23 RX ADMIN — MONTELUKAST 10 MG: 10 TABLET, FILM COATED ORAL at 21:08

## 2022-01-23 RX ADMIN — DOCUSATE SODIUM 100 MG: 100 CAPSULE ORAL at 14:19

## 2022-01-23 RX ADMIN — OXYCODONE AND ACETAMINOPHEN 1 TABLET: 5; 325 TABLET ORAL at 05:23

## 2022-01-23 RX ADMIN — IBUPROFEN 800 MG: 400 TABLET, FILM COATED ORAL at 21:08

## 2022-01-23 RX ADMIN — OXYCODONE AND ACETAMINOPHEN 1 TABLET: 5; 325 TABLET ORAL at 14:19

## 2022-01-23 RX ADMIN — LEVOTHYROXINE SODIUM 175 MCG: 0.15 TABLET ORAL at 09:35

## 2022-01-23 RX ADMIN — IBUPROFEN 800 MG: 400 TABLET, FILM COATED ORAL at 13:34

## 2022-01-23 RX ADMIN — METFORMIN HYDROCHLORIDE 2000 MG: 500 TABLET ORAL at 21:08

## 2022-01-23 RX ADMIN — SERTRALINE 50 MG: 50 TABLET, FILM COATED ORAL at 09:37

## 2022-01-23 RX ADMIN — OXYCODONE AND ACETAMINOPHEN 1 TABLET: 5; 325 TABLET ORAL at 00:01

## 2022-01-23 RX ADMIN — PANTOPRAZOLE SODIUM 20 MG: 20 TABLET, DELAYED RELEASE ORAL at 09:41

## 2022-01-23 RX ADMIN — SIMETHICONE 80 MG: 80 TABLET, CHEWABLE ORAL at 18:38

## 2022-01-23 NOTE — PROGRESS NOTES
Post-Operative  Day 2    Jenny Randolph     Assessment: Post-Op day 2, doing well    Plan:   - Routine post-operative care. - Postop hemoglobin 9.3 (10.9). Plan to start Fe at discharge if pt anemic.  - Ambulate today.     - GHTN - BPs normal since delivery  - hypothyroid - on synthroid  - anxiety - zoloft  - BMI>50 - lovenox, NELSON dressing  - allergies - on zyrtec  - GDM - insulin during pregnancy, on metformin prior to pregnancy- restarted  - asthma - singulair  - Plan for discharge [id number] Discharge Date: Tomorrow. Information for the patient's :  Rodolph Counts [[id number]]   , Low Transverse     Patient doing well without significant complaint. Tolerating regular diet. Ambulating. Voiding without difficulty. Vitals:  Visit Vitals  /82 (BP 1 Location: Right arm, BP Patient Position: At rest)   Pulse 98   Temp 98 °F (36.7 °C)   Resp 18   Ht 5' 7\" (1.702 m)   Wt (!) 171.5 kg (378 lb)   SpO2 96%   Breastfeeding No   BMI 59.20 kg/m²     Temp (24hrs), Av.5 °F (36.9 °C), Min:98 °F (36.7 °C), Max:99.8 °F (37.7 °C)        Exam:       Patient without distress. Fundus firm, nontender limited by habitus. Incision bandaged. Clean, dry, intact. Perineum with normal lochia noted per nursing assessment. Lower extremities are negative for pathological edema.     Labs:   Lab Results   Component Value Date/Time    WBC 7.3 2022 04:00 AM    WBC 8.8 2022 01:41 PM    WBC 7.8 2020 11:17 AM    WBC 5.5 2019 11:40 AM    WBC 4.9 2019 10:06 AM    HGB 9.3 (L) 2022 04:00 AM    HGB 10.9 (L) 2022 01:41 PM    HGB 12.1 2020 11:17 AM    HGB 12.6 2019 11:40 AM    HGB 13.6 2019 10:06 AM    HCT 29.2 (L) 2022 04:00 AM    HCT 34.5 (L) 2022 01:41 PM    HCT 36.0 2020 11:17 AM    HCT 37.3 2019 11:40 AM    HCT 41.3 2019 10:06 AM    PLATELET 244  04:00 AM    PLATELET 263 01/21/2022 01:41 PM    PLATELET 894 42/59/3250 11:17 AM    PLATELET 673 81/93/9084 11:40 AM    PLATELET 145 44/00/6899 10:06 AM       No results found for this or any previous visit (from the past 24 hour(s)).

## 2022-01-23 NOTE — LACTATION NOTE
This note was copied from a baby's chart. Baby continued sucking habit on tongue and lips, during the night,  And also had low blood sugar that was not resolving with first dose of glucose gel, pediatrician decided not to give second dose but instead ordered formula supplementation. Mother stopped trying to breastfeed. Baby started getting that by bottle, mother started pumping and was discouraged by limited ability of pump to get thick colostrum to come out. Mother is discouraged but still wants to breastfeed. I worked with baby but he was very sleepy after receiving multiple bottles frequently. He was not showing much hunger but worked with us for about 15 minutes before falling asleep. Baby could not latch directly. Nipple shield applied, this worked for a few minutes but parents could not latch baby consistently using this method. Mother became overwhelmed and frustrated. Father taught how to offer supplement via syringe. Several optional feeding plans discussed. Mother does not feel she has the ability due to feeling tired and in some general pain to continue trying today. She would like to breastfeed, and has hope to resume trying tomorrow after getting some rest.  Feeding Plan  Father will syringe/finger feed today to reinforce good sucking habits, stop tongue thrusting, stop lip sucking, in favor of productive suck. Mother will pump and offer EBM as able. Mother will make a decision about long term goals in the next 24-48 hours as her milk begins to change.

## 2022-01-23 NOTE — ROUTINE PROCESS
1600- Bedside shift change report given to VISHAL Cotter RN (oncoming nurse) by DHEERAJ Castano RN (offgoing nurse). Report included the following information SBAR.

## 2022-01-23 NOTE — ROUTINE PROCESS
Bedside and Verbal shift change report given to VANESSA Stahl RN (oncoming nurse) by VISHAL Lin RN (offgoing nurse). Report included the following information SBAR.

## 2022-01-23 NOTE — ROUTINE PROCESS
Bedside shift change report given to BRIAN Evans (oncoming nurse) by Cesar Mcelroy RN (offgoing nurse). Report included the following information SBAR.

## 2022-01-24 VITALS
DIASTOLIC BLOOD PRESSURE: 88 MMHG | RESPIRATION RATE: 18 BRPM | HEART RATE: 94 BPM | SYSTOLIC BLOOD PRESSURE: 138 MMHG | BODY MASS INDEX: 45.99 KG/M2 | HEIGHT: 67 IN | TEMPERATURE: 98.2 F | WEIGHT: 293 LBS | OXYGEN SATURATION: 95 %

## 2022-01-24 PROCEDURE — 74011250637 HC RX REV CODE- 250/637: Performed by: ADVANCED PRACTICE MIDWIFE

## 2022-01-24 PROCEDURE — 74011250636 HC RX REV CODE- 250/636: Performed by: OBSTETRICS & GYNECOLOGY

## 2022-01-24 PROCEDURE — 74011250637 HC RX REV CODE- 250/637: Performed by: OBSTETRICS & GYNECOLOGY

## 2022-01-24 RX ORDER — OXYCODONE AND ACETAMINOPHEN 5; 325 MG/1; MG/1
1 TABLET ORAL
Qty: 30 TABLET | Refills: 0 | Status: SHIPPED | OUTPATIENT
Start: 2022-01-24 | End: 2022-01-31

## 2022-01-24 RX ORDER — FERROUS SULFATE 137(45) MG
142 TABLET, EXTENDED RELEASE ORAL
Qty: 30 TABLET | Refills: 0 | Status: SHIPPED | OUTPATIENT
Start: 2022-01-24 | End: 2022-02-23

## 2022-01-24 RX ORDER — DOCUSATE SODIUM 100 MG/1
100 CAPSULE, LIQUID FILLED ORAL
Qty: 60 CAPSULE | Refills: 1 | Status: SHIPPED | OUTPATIENT
Start: 2022-01-24 | End: 2022-04-05 | Stop reason: ALTCHOICE

## 2022-01-24 RX ORDER — IBUPROFEN 600 MG/1
600 TABLET ORAL
Qty: 40 TABLET | Refills: 0 | Status: SHIPPED | OUTPATIENT
Start: 2022-01-24 | End: 2022-04-05 | Stop reason: ALTCHOICE

## 2022-01-24 RX ADMIN — DOCUSATE SODIUM 100 MG: 100 CAPSULE ORAL at 09:24

## 2022-01-24 RX ADMIN — SERTRALINE 50 MG: 50 TABLET, FILM COATED ORAL at 09:20

## 2022-01-24 RX ADMIN — ENOXAPARIN SODIUM 40 MG: 100 INJECTION SUBCUTANEOUS at 09:21

## 2022-01-24 RX ADMIN — OXYCODONE AND ACETAMINOPHEN 1 TABLET: 5; 325 TABLET ORAL at 06:03

## 2022-01-24 RX ADMIN — LEVOTHYROXINE SODIUM 175 MCG: 0.15 TABLET ORAL at 09:20

## 2022-01-24 RX ADMIN — IBUPROFEN 800 MG: 400 TABLET, FILM COATED ORAL at 05:31

## 2022-01-24 RX ADMIN — PANTOPRAZOLE SODIUM 20 MG: 20 TABLET, DELAYED RELEASE ORAL at 09:21

## 2022-01-24 RX ADMIN — OXYCODONE AND ACETAMINOPHEN 1 TABLET: 5; 325 TABLET ORAL at 10:20

## 2022-01-24 RX ADMIN — CETIRIZINE HYDROCHLORIDE 10 MG: 10 TABLET, FILM COATED ORAL at 09:20

## 2022-01-24 NOTE — ROUTINE PROCESS
Bedside shift change report given to BRIAN Alcantar (oncoming nurse) by Nancy Shell RN (offgoing nurse). Report included the following information SBAR. 1230-Pt discharged home with family. Discharge instructions and medication times reviewed. Family verbalized understanding. Signature obtained on paper and placed on chart.

## 2022-01-24 NOTE — PROGRESS NOTES
Post-Operative  Day 3    Jenny Randolph       Assessment: Post-Op day 3, doing well    GHTN: normotensive since delivery, no meds    Hypothyroidism: on synthroid, has Rx at home    Acute on chronic blood loss anemia: Hgb 10.9->9.3, pt asymptomatic. Plan supplemental iron after discharge    Anxiety: on zoloft    BMI >50: on Lovenox while hospitalized. Encouraged daily ambulation. GDM: insulin during pregnancy, on metformin prior to pregnancy- restarted    Asthma: on singulair    Plan:   - Discharge home today. - Follow up in office on Friday for BP/mood check and NELSON removal      Information for the patient's :  Jammie Smith [[id number]]   , Low Transverse      Subjective:  Patient doing well without significant complaint. Tolerating regular diet. Ambulating. Voiding without difficulty. Vitals:  Visit Vitals  /76 (BP 1 Location: Left arm)   Pulse 84   Temp 98.9 °F (37.2 °C)   Resp 20   Ht 5' 7\" (1.702 m)   Wt (!) 171.5 kg (378 lb)   SpO2 96%   Breastfeeding Unknown   BMI 59.20 kg/m²     Temp (24hrs), Av.9 °F (37.2 °C), Min:98.1 °F (36.7 °C), Max:100 °F (37.8 °C)        Exam:       Patient without distress. Fundus firm, nontender per nursing fundal checks. Incision bandaged. NELSON in place and to suction. Clean, dry, intact. Perineum with normal lochia noted per nursing assessment. Lower extremities are negative for pathological edema.     Labs:   Lab Results   Component Value Date/Time    WBC 7.3 2022 04:00 AM    WBC 8.8 2022 01:41 PM    WBC 7.8 2020 11:17 AM    WBC 5.5 2019 11:40 AM    WBC 4.9 2019 10:06 AM    HGB 9.3 (L) 2022 04:00 AM    HGB 10.9 (L) 2022 01:41 PM    HGB 12.1 2020 11:17 AM    HGB 12.6 2019 11:40 AM    HGB 13.6 2019 10:06 AM    HCT 29.2 (L) 2022 04:00 AM    HCT 34.5 (L) 2022 01:41 PM    HCT 36.0 2020 11:17 AM    HCT 37.3 05/31/2019 11:40 AM    HCT 41.3 02/21/2019 10:06 AM    PLATELET 425 54/59/8386 04:00 AM    PLATELET 153 01/60/5237 01:41 PM    PLATELET 287 38/67/9895 11:17 AM    PLATELET 783 73/07/5695 11:40 AM    PLATELET 601 51/75/2496 10:06 AM       No results found for this or any previous visit (from the past 24 hour(s)).

## 2022-01-24 NOTE — DISCHARGE SUMMARY
Obstetrical Discharge Summary     Name: Maday Dominguez MRN: [id number]  SSN: [id number]    YOB: 1985  Age: 39 y.o. 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 Apgars were 9  and 9 . Additional Diagnoses:   Hospital Problems  Date Reviewed: 2/3/2021    None         Lab Results   Component Value Date/Time    Rubella, External immune 2021 12:00 AM    GrBStrep, External negative 2022 12:00 AM       Hospital Course: Patient was admitted for delivery at 37 weeks for Gestational Hypertension. She had an uncomplicated  section due to a history of Chiari malformation. She was given Lovenox for DVT prophylaxis postpartum. Her blood pressures were normal post-op without medication. She had a NELSON wound vac in place. Patient Instructions:   Current Discharge Medication List      START taking these medications    Details   oxyCODONE-acetaminophen (PERCOCET) 5-325 mg per tablet Take 1 Tablet by mouth every four (4) hours as needed for Pain for up to 7 days. Max Daily Amount: 6 Tablets. Qty: 30 Tablet, Refills: 0    Associated Diagnoses: Postoperative pain      ferrous sulfate (Slow Fe) 142 mg (45 mg iron) ER tablet Take 1 Tablet by mouth Daily (before breakfast) for 30 days. Qty: 30 Tablet, Refills: 0      docusate sodium (Colace) 100 mg capsule Take 1 Capsule by mouth two (2) times daily as needed for Constipation.   Qty: 60 Capsule, Refills: 1         CONTINUE these medications which have CHANGED    Details   ibuprofen (MOTRIN) 600 mg tablet Take 1 Tablet by mouth every six (6) hours as needed for Pain. Qty: 40 Tablet, Refills: 0         CONTINUE these medications which have NOT CHANGED    Details   sertraline (ZOLOFT) 50 mg tablet Take  by mouth daily. levothyroxine (SYNTHROID) 200 mcg tablet Take 175 mcg by mouth daily. montelukast (SINGULAIR) 10 mg tablet Take 1 Tab by mouth daily. Qty: 90 Tab, Refills: 1    Associated Diagnoses: Chronic allergic rhinitis; Pneumonia of right middle lobe due to infectious organism      PNV Comb #2-Iron-FA-Omega 3 29-1-400 mg cmpk Take  by mouth. albuterol (PROVENTIL VENTOLIN) 2.5 mg /3 mL (0.083 %) nebu 3 mL by Nebulization route every six (6) hours as needed for Wheezing, Shortness of Breath or Cough. Qty: 120 Nebule, Refills: 0    Associated Diagnoses: Pneumonia of right middle lobe due to infectious organism      Nebulizer & Compressor machine Use as directed for albuterol prn. Qty: 1 Each, Refills: 0    Associated Diagnoses: Pneumonia of right middle lobe due to infectious organism      augmented betamethasone dipropionate (DIPROLENE-AF) 0.05 % topical cream Apply  to affected area as needed for Skin Irritation.          STOP taking these medications       omeprazole (PRILOSEC) 10 mg capsule Comments:   Reason for Stopping:         insulin regular (NOVOLIN R, HUMULIN R) 100 unit/mL injection Comments:   Reason for Stopping:         aspirin delayed-release 81 mg tablet Comments:   Reason for Stopping:         ergocalciferol (ERGOCALCIFEROL) 1,250 mcg (50,000 unit) capsule Comments:   Reason for Stopping:         azithromycin (ZITHROMAX) 250 mg tablet Comments:   Reason for Stopping:         cyclobenzaprine (FLEXERIL) 10 mg tablet Comments:   Reason for Stopping:         lidocaine 4 % patch Comments:   Reason for Stopping:         albuterol (Ventolin HFA) 90 mcg/actuation inhaler Comments:   Reason for Stopping:         Minoxidil (ROGAINE) 5 % foam Comments:   Reason for Stopping:         multivitamin (ONE A DAY) tablet Comments:   Reason for Stopping:         loratadine (CLARITIN) 10 mg tablet Comments:   Reason for Stopping:               Disposition at Discharge: Home or self care    Condition at Discharge: Stable    Reference my discharge instructions. Follow-up Appointments   Procedures    FOLLOW UP VISIT Appointment in: 3 - 5 Days     Standing Status:   Standing     Number of Occurrences:   1     Order Specific Question:   Appointment in     Answer:   3 - 5 Days        Signed By:  Rob Flores MD     January 24, 2022                    .

## 2022-01-24 NOTE — LACTATION NOTE
This note was copied from a baby's chart. Mom and baby scheduled for discharge today. Mom states she has been struggling to get baby to latch. He has started sucking his tongue. Mom has been pumping and then supplementing with EBM and formula. Baby's weight loss was 7.9% this morning. I helped mom with a feeding this morning. We were able to get the baby latched in a modified football hold. Baby was sitting up at the breast. He was sucking rhythmically with some swallowing noted. Mom has follow up appointment with pediatrician tomorrow. Breast feeding teaching completed and all questions answered.

## 2022-01-24 NOTE — ANESTHESIA POSTPROCEDURE EVALUATION
Post-Anesthesia Evaluation and Assessment    Patient: Genie Humphries MRN: [id number]  SSN: [id number]    YOB: 1985  Age: 39 y.o. Sex: female      Cardiovascular Function/Vital Signs  Visit Vitals  /88 (BP 1 Location: Left arm, BP Patient Position: At rest;Sitting)   Pulse 94   Temp 36.8 °C (98.2 °F)   Resp 18   Ht 5' 7\" (1.702 m)   Wt (!) 171.5 kg (378 lb)   SpO2 95%   Breastfeeding Unknown   BMI 59.20 kg/m²       Patient is status post Spinal anesthesia for Procedure(s):   SECTION. Nausea/Vomiting: None    Postoperative hydration reviewed and adequate. Pain:  Pain Scale 1: Numeric (0 - 10) (22 1120)  Pain Intensity 1: 2 (22 1120)   Managed    Neurological Status:       Block resolving    Mental Status, Level of Consciousness: Alert and  oriented to person, place, and time    Pulmonary Status:   O2 Device: None (Room air) (22 0915)   Adequate oxygenation and airway patent    Complications related to anesthesia: None    Post-anesthesia assessment completed. No concerns    Signed By: Fern Pineda MD     2022              Procedure(s):   SECTION.     spinal    <BSHSIANPOST>    INITIAL Post-op Vital signs:   Vitals Value Taken Time   /65 22 1730   Temp 36.5 °C (97.7 °F) 22 1730   Pulse 93 22 1545   Resp 16 22 1545   SpO2 100 % 22 1715

## 2022-01-24 NOTE — DISCHARGE INSTRUCTIONS
Patient Education         Section: What to Expect at Home  Your Recovery     A  section, or , is surgery to deliver your baby through a cut that the doctor makes in your lower belly and uterus. The cut is called an incision. You may have some pain in your lower belly and need pain medicine for 1 to 2 weeks. You can expect some vaginal bleeding for several weeks. You will probably need about 6 weeks to fully recover. It's important to take it easy while the incision heals. Avoid heavy lifting, strenuous activities, and exercises that strain the belly muscles while you recover. Ask a family member or friend for help with housework, cooking, and shopping. This care sheet gives you a general idea about how long it will take for you to recover. But each person recovers at a different pace. Follow the steps below to get better as quickly as possible. How can you care for yourself at home? Activity    · Rest when you feel tired. Getting enough sleep will help you recover.     · Try to walk each day. Start by walking a little more than you did the day before. Bit by bit, increase the amount you walk. Walking boosts blood flow and helps prevent pneumonia, constipation, and blood clots.     · Avoid strenuous activities, such as bicycle riding, jogging, weightlifting, and aerobic exercise, for 6 weeks or until your doctor says it is okay.     · Until your doctor says it is okay, do not lift anything heavier than your baby.     · Do not do sit-ups or other exercises that strain the belly muscles for 6 weeks or until your doctor says it is okay.     · Hold a pillow over your incision when you cough or take deep breaths. This will support your belly and decrease your pain.     · You may shower as usual. Pat the incision dry when you are done.     · You will have some vaginal bleeding. Wear sanitary pads.  Do not douche or use tampons until your doctor says it is okay.     · Ask your doctor when you can drive again.     · You will probably need to take at least 6 weeks off work. It depends on the type of work you do and how you feel.     · Ask your doctor when it is okay for you to have sex. Diet    · You can eat your normal diet. If your stomach is upset, try bland, low-fat foods like plain rice, broiled chicken, toast, and yogurt.     · Drink plenty of fluids (unless your doctor tells you not to).     · You may notice that your bowel movements are not regular right after your surgery. This is common. Try to avoid constipation and straining with bowel movements. You may want to take a fiber supplement every day. If you have not had a bowel movement after a couple of days, ask your doctor about taking a mild laxative.     · If you are breastfeeding, limit alcohol. Alcohol can cause a lack of energy and other health problems for the baby when a breastfeeding woman drinks heavily. It can also get in the way of a mom's ability to feed her baby or to care for the child in other ways. There isn't a lot of research about exactly how much alcohol can harm a baby. Having no alcohol is the safest choice for your baby. If you choose to have a drink now and then, have only one drink, and limit the number of occasions that you have a drink. Wait to breastfeed at least 2 hours after you have a drink to reduce the amount of alcohol the baby may get in the milk. Medicines    · Your doctor will tell you if and when you can restart your medicines. He or she will also give you instructions about taking any new medicines.     · If you take aspirin or some other blood thinner, ask your doctor if and when to start taking it again. Make sure that you understand exactly what your doctor wants you to do.     · Take pain medicines exactly as directed. ? If the doctor gave you a prescription medicine for pain, take it as prescribed.   ? If you are not taking a prescription pain medicine, ask your doctor if you can take an over-the-counter medicine.     · If you think your pain medicine is making you sick to your stomach:  ? Take your medicine after meals (unless your doctor has told you not to). ? Ask your doctor for a different pain medicine.     · If your doctor prescribed antibiotics, take them as directed. Do not stop taking them just because you feel better. You need to take the full course of antibiotics. Incision care    · If you have strips of tape on the incision, leave the tape on for a week or until it falls off.     · Wash the area daily with warm, soapy water, and pat it dry. Don't use hydrogen peroxide or alcohol, which can slow healing. You may cover the area with a gauze bandage if it weeps or rubs against clothing. Change the bandage every day.     · Keep the area clean and dry. Other instructions    · If you breastfeed your baby, you may be more comfortable while you are healing if you place the baby so that he or she is not resting on your belly. Try tucking your baby under your arm, with his or her body along the side you will be feeding on. Support your baby's upper body with your arm. With that hand you can control your baby's head to bring his or her mouth to your breast. This is sometimes called the football hold. Follow-up care is a key part of your treatment and safety. Be sure to make and go to all appointments, and call your doctor if you are having problems. It's also a good idea to know your test results and keep a list of the medicines you take. When should you call for help? Call 911  anytime you think you may need emergency care. For example, call if:    · You have thoughts of harming yourself, your baby, or another person.     · You passed out (lost consciousness).     · You have chest pain, are short of breath, or cough up blood.     · You have a seizure.    Call your doctor now or seek immediate medical care if:    · You have pain that does not get better after you take pain medicine.     · You have severe vaginal bleeding.     · You are dizzy or lightheaded, or you feel like you may faint.     · You have new or worse pain in your belly or pelvis.     · You have loose stitches, or your incision comes open.     · You have symptoms of infection, such as:  ? Increased pain, swelling, warmth, or redness. ? Red streaks leading from the incision. ? Pus draining from the incision. ? A fever.     · You have symptoms of a blood clot in your leg (called a deep vein thrombosis), such as:  ? Pain in your calf, back of the knee, thigh, or groin. ? Redness and swelling in your leg or groin.     · You have signs of preeclampsia, such as:  ? Sudden swelling of your face, hands, or feet. ? New vision problems (such as dimness, blurring, or seeing spots). ? A severe headache. Watch closely for changes in your health, and be sure to contact your doctor if:    · You do not get better as expected. Where can you learn more? Go to http://www.schroeder.com/  Enter M806 in the search box to learn more about \" Section: What to Expect at Home. \"  Current as of: 2021               Content Version: 13.0  © 2971-8775 [employer]. Care instructions adapted under license by [employer] (which disclaims liability or warranty for this information). If you have questions about a medical condition or this instruction, always ask your healthcare professional. [place] [address] any warranty or liability for your use of this information. Postpartum Support Groups  We know that all of us are dealing with a tremendous amount of uncertainty, confusion and disruption to our daily lives, which may result in increased anxiety, depression and fear. If you are feeling unsettled or worse, please know that we are here to help.  During this time of increased caution and care for one another, Postpartum Support [place] ( [facility]) is offering virtual support groups to ALL MOTHERS in [place] regardless of the age of your child/children as a way to help weather this emotional storm together. Social support is an important part of self-care during this time of physical distancing. Virtual postpartum support group meetings available at www. postpartumva.org  Warm Line: 998.787.8969    Breastfeeding Support Groups (virtual)  1st and 3rd Wednesday of each month  2nd and 4th Tuesday of each month    [place] at www.[facility] under the \"About Us\" and \"Classes and Events tabs\"

## 2022-02-01 ENCOUNTER — TELEPHONE (OUTPATIENT)
Dept: MOTHER INFANT UNIT | Age: 37
End: 2022-02-01

## 2022-02-01 NOTE — TELEPHONE ENCOUNTER
Patient states she and the baby are doing well. Baby is still struggling to latch so mom is pumping and baby is getting expressed breast milk in a bottle. Mom has no questions at this time.

## 2022-03-18 PROBLEM — F41.9 ANXIETY DISORDER: Status: ACTIVE | Noted: 2019-01-31

## 2022-03-18 PROBLEM — I10 HYPERTENSION, ESSENTIAL: Status: ACTIVE | Noted: 2020-05-13

## 2022-03-18 PROBLEM — E66.01 OBESITY, MORBID (HCC): Status: ACTIVE | Noted: 2019-05-31

## 2022-03-18 PROBLEM — S39.012A LUMBAR STRAIN, INITIAL ENCOUNTER: Status: ACTIVE | Noted: 2019-07-17

## 2022-03-18 PROBLEM — S76.012A MUSCLE STRAIN OF LEFT GLUTEAL REGION: Status: ACTIVE | Noted: 2019-07-17

## 2022-03-19 PROBLEM — F32.5 MAJOR DEPRESSIVE DISORDER WITH SINGLE EPISODE, IN FULL REMISSION (HCC): Status: ACTIVE | Noted: 2019-01-31

## 2022-03-19 PROBLEM — J45.20 MILD INTERMITTENT ASTHMA WITHOUT COMPLICATION: Status: ACTIVE | Noted: 2021-01-19

## 2022-03-19 PROBLEM — J30.9 CHRONIC ALLERGIC RHINITIS: Status: ACTIVE | Noted: 2020-05-13

## 2022-03-19 PROBLEM — M35.2 BEHCET'S DISEASE (HCC): Status: ACTIVE | Noted: 2019-07-17

## 2022-03-19 PROBLEM — E03.9 ACQUIRED HYPOTHYROIDISM: Status: ACTIVE | Noted: 2020-05-13

## 2022-03-19 PROBLEM — S46.912A STRAIN OF LEFT SHOULDER: Status: ACTIVE | Noted: 2019-07-17

## 2022-03-19 PROBLEM — Z79.60 LONG-TERM USE OF IMMUNOSUPPRESSANT MEDICATION: Status: ACTIVE | Noted: 2019-08-21

## 2022-03-19 PROBLEM — E55.9 VITAMIN D DEFICIENCY: Status: ACTIVE | Noted: 2019-08-21

## 2022-03-19 PROBLEM — K76.0 FATTY LIVER: Status: ACTIVE | Noted: 2021-01-22

## 2022-04-05 ENCOUNTER — OFFICE VISIT (OUTPATIENT)
Dept: FAMILY MEDICINE CLINIC | Age: 37
End: 2022-04-05
Payer: COMMERCIAL

## 2022-04-05 VITALS
SYSTOLIC BLOOD PRESSURE: 128 MMHG | HEART RATE: 90 BPM | BODY MASS INDEX: 45.99 KG/M2 | DIASTOLIC BLOOD PRESSURE: 74 MMHG | TEMPERATURE: 97.7 F | WEIGHT: 293 LBS | RESPIRATION RATE: 20 BRPM | HEIGHT: 67 IN | OXYGEN SATURATION: 97 %

## 2022-04-05 DIAGNOSIS — R73.9 HYPERGLYCEMIA: Primary | ICD-10-CM

## 2022-04-05 DIAGNOSIS — E03.9 ACQUIRED HYPOTHYROIDISM: ICD-10-CM

## 2022-04-05 DIAGNOSIS — J30.9 CHRONIC ALLERGIC RHINITIS: ICD-10-CM

## 2022-04-05 DIAGNOSIS — F32.5 MAJOR DEPRESSIVE DISORDER WITH SINGLE EPISODE, IN FULL REMISSION (HCC): ICD-10-CM

## 2022-04-05 DIAGNOSIS — Z13.220 SCREENING FOR LIPOID DISORDERS: ICD-10-CM

## 2022-04-05 DIAGNOSIS — D50.9 IRON DEFICIENCY ANEMIA, UNSPECIFIED IRON DEFICIENCY ANEMIA TYPE: ICD-10-CM

## 2022-04-05 DIAGNOSIS — F41.1 GENERALIZED ANXIETY DISORDER: ICD-10-CM

## 2022-04-05 DIAGNOSIS — J45.20 MILD INTERMITTENT ASTHMA WITHOUT COMPLICATION: ICD-10-CM

## 2022-04-05 DIAGNOSIS — K21.9 GASTROESOPHAGEAL REFLUX DISEASE WITHOUT ESOPHAGITIS: ICD-10-CM

## 2022-04-05 DIAGNOSIS — R73.02 IGT (IMPAIRED GLUCOSE TOLERANCE): ICD-10-CM

## 2022-04-05 DIAGNOSIS — E55.9 VITAMIN D DEFICIENCY: ICD-10-CM

## 2022-04-05 PROBLEM — S76.012A MUSCLE STRAIN OF LEFT GLUTEAL REGION: Status: RESOLVED | Noted: 2019-07-17 | Resolved: 2022-04-05

## 2022-04-05 PROBLEM — S46.912A STRAIN OF LEFT SHOULDER: Status: RESOLVED | Noted: 2019-07-17 | Resolved: 2022-04-05

## 2022-04-05 PROBLEM — Z79.60 LONG-TERM USE OF IMMUNOSUPPRESSANT MEDICATION: Status: RESOLVED | Noted: 2019-08-21 | Resolved: 2022-04-05

## 2022-04-05 PROBLEM — I10 HYPERTENSION, ESSENTIAL: Status: RESOLVED | Noted: 2020-05-13 | Resolved: 2022-04-05

## 2022-04-05 PROBLEM — S39.012A LUMBAR STRAIN, INITIAL ENCOUNTER: Status: RESOLVED | Noted: 2019-07-17 | Resolved: 2022-04-05

## 2022-04-05 PROCEDURE — 99204 OFFICE O/P NEW MOD 45 MIN: CPT | Performed by: FAMILY MEDICINE

## 2022-04-05 RX ORDER — OMEPRAZOLE 20 MG/1
20 CAPSULE, DELAYED RELEASE ORAL DAILY
Qty: 90 CAPSULE | Refills: 3 | Status: SHIPPED | OUTPATIENT
Start: 2022-04-05

## 2022-04-05 RX ORDER — SERTRALINE HYDROCHLORIDE 50 MG/1
50 TABLET, FILM COATED ORAL DAILY
Qty: 90 TABLET | Refills: 3 | Status: SHIPPED | OUTPATIENT
Start: 2022-04-05

## 2022-04-05 RX ORDER — ALBUTEROL SULFATE 90 UG/1
AEROSOL, METERED RESPIRATORY (INHALATION)
COMMUNITY
End: 2022-04-05 | Stop reason: SDUPTHER

## 2022-04-05 RX ORDER — MONTELUKAST SODIUM 10 MG/1
10 TABLET ORAL DAILY
Qty: 90 TABLET | Refills: 3 | Status: SHIPPED | OUTPATIENT
Start: 2022-04-05

## 2022-04-05 RX ORDER — OMEPRAZOLE 20 MG/1
20 CAPSULE, DELAYED RELEASE ORAL DAILY
COMMUNITY
End: 2022-04-05 | Stop reason: SDUPTHER

## 2022-04-05 RX ORDER — ALBUTEROL SULFATE 90 UG/1
2 AEROSOL, METERED RESPIRATORY (INHALATION)
Qty: 18 G | Refills: 3 | Status: SHIPPED | OUTPATIENT
Start: 2022-04-05

## 2022-04-05 RX ORDER — FERROUS SULFATE 137(45) MG
TABLET, EXTENDED RELEASE ORAL
COMMUNITY
End: 2022-04-26 | Stop reason: SDUPTHER

## 2022-04-05 RX ORDER — LEVOTHYROXINE SODIUM 175 UG/1
175 TABLET ORAL DAILY
Qty: 90 TABLET | Refills: 3 | Status: SHIPPED | OUTPATIENT
Start: 2022-04-05

## 2022-04-05 RX ORDER — FERROUS SULFATE 137(45) MG
TABLET, EXTENDED RELEASE ORAL
Status: CANCELLED | OUTPATIENT
Start: 2022-04-05

## 2022-04-05 RX ORDER — METFORMIN HYDROCHLORIDE 500 MG/1
2000 TABLET, EXTENDED RELEASE ORAL
Qty: 360 TABLET | Refills: 3 | Status: SHIPPED | OUTPATIENT
Start: 2022-04-05

## 2022-04-05 NOTE — PROGRESS NOTES
Jenny STARKS Monica Romo is a 39 y.o. female    Transferring to us from [facility]. HPI:  Hx insulin resistance  Prev mgd by diet. Working on changing to low carb (30g/meal), 150g/d. On metformin 2g/d (4x 500mg ER), has plenty. Had elev FBG's to 130's, rare 200's, hasn't checked since delivery. Is losing weight nicely. Elev BP's during pregnancy, but no prior hx of Hypertension. Blood pressures improving. On diet/ TLC's at this time. Management at last visit included monitoring only. Patient denies chest pain, palpitations, peripheral edema. Lab review:     Lab Results   Component Value Date/Time    Sodium 135 (L) 01/21/2022 01:41 PM    Potassium 4.2 01/21/2022 01:41 PM    Chloride 105 01/21/2022 01:41 PM    CO2 23 01/21/2022 01:41 PM    Anion gap 7 01/21/2022 01:41 PM    Glucose 72 01/21/2022 01:41 PM    BUN 12 01/21/2022 01:41 PM    Creatinine 0.60 01/21/2022 01:41 PM    BUN/Creatinine ratio 20 01/21/2022 01:41 PM    GFR est AA >60 01/21/2022 01:41 PM    GFR est non-AA >60 01/21/2022 01:41 PM    Calcium 9.0 01/21/2022 01:41 PM       Asthma and allergies  Current control: Good   Current level: mild intermittent  Current symptoms: none  Current controller: singulair 10  Current side effects: none  Last flareup: fall 2020. Number of flareups in past year: none  Current symptom relief med: albuterol HFA    Anxiety/Depression hx  Doing well on sertraline 50mg/d. No sx of post-partum depression lately. Did not have sx since delivery.   3 most recent PHQ Screens 4/5/2022   PHQ Not Done -   Little interest or pleasure in doing things Not at all   Feeling down, depressed, irritable, or hopeless Not at all   Total Score PHQ 2 0   Trouble falling or staying asleep, or sleeping too much -   Feeling tired or having little energy -   Poor appetite, weight loss, or overeating -   Feeling bad about yourself - or that you are a failure or have let yourself or your family down -   Trouble concentrating on things such as school, work, reading, or watching TV -   Moving or speaking so slowly that other people could have noticed; or the opposite being so fidgety that others notice -   Thoughts of being better off dead, or hurting yourself in some way -   PHQ 9 Score -   How difficult have these problems made it for you to do your work, take care of your home and get along with others -     PMH, SH, Medications/Allergies: reviewed, on chart. Current Outpatient Medications   Medication Sig    albuterol (PROVENTIL HFA, VENTOLIN HFA, PROAIR HFA) 90 mcg/actuation inhaler Take  by inhalation.  loratadine 10 mg cap Take  by mouth.  ferrous sulfate (Slow Fe) 142 mg (45 mg iron) ER tablet Take  by mouth Daily (before breakfast).  omeprazole (PRILOSEC) 20 mg capsule Take 20 mg by mouth daily.  methylcellulose (FIBER THERAPY) by Does Not Apply route.  metformin HCl (METFORMIN PO) Take  by mouth.  prenatal 56/TOEO fum/folic/dha (PRENATAL-1 PO) Take  by mouth.  sertraline (ZOLOFT) 50 mg tablet Take  by mouth daily.  montelukast (SINGULAIR) 10 mg tablet Take 1 Tab by mouth daily.  ibuprofen (MOTRIN) 600 mg tablet Take 1 Tablet by mouth every six (6) hours as needed for Pain.  docusate sodium (Colace) 100 mg capsule Take 1 Capsule by mouth two (2) times daily as needed for Constipation.  PNV Comb #2-Iron-FA-Omega 3 29-1-400 mg cmpk Take  by mouth. (Patient not taking: Reported on 1/21/2022)    albuterol (PROVENTIL VENTOLIN) 2.5 mg /3 mL (0.083 %) nebu 3 mL by Nebulization route every six (6) hours as needed for Wheezing, Shortness of Breath or Cough.  Nebulizer & Compressor machine Use as directed for albuterol prn.  augmented betamethasone dipropionate (DIPROLENE-AF) 0.05 % topical cream Apply  to affected area as needed for Skin Irritation.  levothyroxine (SYNTHROID) 200 mcg tablet Take 175 mcg by mouth daily. No current facility-administered medications for this visit.       ROS:  Constitutional: No fever, chills or abnormal weight loss  Respiratory: No cough, SOB   CV: No chest pain or Palpitations    Visit Vitals  /74   Pulse 90   Temp 97.7 °F (36.5 °C) (Temporal)   Resp 20   Ht 5' 7\" (1.702 m)   Wt (!) 365 lb 9.6 oz (165.8 kg)   LMP 04/30/2021   SpO2 97%   BMI 57.26 kg/m²     Wt Readings from Last 3 Encounters:   04/05/22 (!) 365 lb 9.6 oz (165.8 kg)   01/21/22 (!) 378 lb (171.5 kg)   12/15/20 (!) 367 lb 12.8 oz (166.8 kg)     BP Readings from Last 3 Encounters:   04/05/22 128/74   01/24/22 138/88   12/15/20 (!) 181/99     Physical Examination: General appearance - alert, well appearing, and in no distress  Mental status - alert, oriented to person, place, and time  Eyes - pupils equal and reactive, extraocular eye movements intact  ENT - bilateral external ears and nose normal. Normal lips  Neck - supple, no significant adenopathy, no thyromegaly or mass  Chest - clear to auscultation, no wheezes, rales or rhonchi, symmetric air entry  Heart - normal rate, regular rhythm, normal S1, S2, no murmurs, rubs, clicks or gallops  Extremities - peripheral pulses normal, no pedal edema, no clubbing or cyanosis    A/P:  IGT  Check labs. Adjust tx PRN, if good, con't metformin at current doses. HX HTN  Check labs, current BP pretty good. Monitor, work on healthy lifestyle changes. Asthma and allergies  well controlled. con't current tx. Hypothyroid  well controlled. Check labs, adj PRN. Depression and anxiety  well controlled. con't current tx. Vit D deficiency  Check labs, adj PRN. Overweight, with BMI 57  Improving nicely. Will monitor and gently encourage pt to con't healthy lifestyle changes.     F/U 6mo/ PRN

## 2022-04-05 NOTE — PROGRESS NOTES
eJnny Wray is a 39 y.o. female presenting for/with:    Chief Complaint   Patient presents with   [facility] Patient       Visit Vitals  /74   Pulse 90   Temp 97.7 °F (36.5 °C) (Temporal)   Resp 20   Ht 5' 7\" (1.702 m)   Wt (!) 365 lb 9.6 oz (165.8 kg)   LMP 04/30/2021   SpO2 97%   BMI 57.26 kg/m²     Pain Scale: /10  Pain Location:     1. \"Have you been to the ER, urgent care clinic since your last visit? Hospitalized since your last visit? \" No    2. \"Have you seen or consulted any other health care providers outside of the [address] [place] [address] since your last visit? \" Yes Reason for visit: Birth of Son 2 months ago     3. For patients aged 39-70: Has the patient had a colonoscopy / FIT/ Cologuard? NA - based on age      If the patient is female:    4. For patients aged 41-77: Has the patient had a mammogram within the past 2 years? NA - based on age or sex      11. For patients aged 21-65: Has the patient had a pap smear?  Yes - no Care Gap present      Symptom review:  NO  Fever   NO  Shaking chills  NO  Cough  NO  Body aches  NO  Coughing up blood  NO  Chest congestion  NO  Chest pain  NO  Shortness of breath  NO  Profound Loss of smell/taste  NO  Nausea/Vomiting   NO  Loose stool/Diarrhea  NO  any skin issues    Patient Risk Factors Reviewed as follows:  NO  have you been in Close contact with confirmed COVID19 patient   NO  History of recent travel to affected geographical areas within the past 14 days  NO  COPD  NO  Active Cancer/Leukemia/Lymphoma/Chemotherapy  NO  Oral steroid use  NO  Pregnant  NO  Diabetes Mellitus  NO  Heart disease  NO  Asthma  NO Health care worker at home  [address] care worker  NO Is there a Pregnant Woman in the home  NO Dialysis pt in the home   NO a large number of people living in the home    Learning Assessment 5/13/2020   PRIMARY LEARNER Patient   CO-LEARNER CAREGIVER -   PRIMARY LANGUAGE ENGLISH   LEARNER PREFERENCE PRIMARY OTHER (COMMENT)   ANSWERED BY patient RELATIONSHIP SELF     Fall Risk Assessment, last 12 mths 4/5/2022   Able to walk? Yes   Fall in past 12 months? 0   Do you feel unsteady? 0   Are you worried about falling 0       3 most recent PHQ Screens 4/5/2022   PHQ Not Done -   Little interest or pleasure in doing things Not at all   Feeling down, depressed, irritable, or hopeless Not at all   Total Score PHQ 2 0   Trouble falling or staying asleep, or sleeping too much -   Feeling tired or having little energy -   Poor appetite, weight loss, or overeating -   Feeling bad about yourself - or that you are a failure or have let yourself or your family down -   Trouble concentrating on things such as school, work, reading, or watching TV -   Moving or speaking so slowly that other people could have noticed; or the opposite being so fidgety that others notice -   Thoughts of being better off dead, or hurting yourself in some way -   PHQ 9 Score -   How difficult have these problems made it for you to do your work, take care of your home and get along with others -     Abuse Screening Questionnaire 4/5/2022   Do you ever feel afraid of your partner? N   Are you in a relationship with someone who physically or mentally threatens you? N   Is it safe for you to go home?  Y       ADL Assessment 4/5/2022   Feeding yourself No Help Needed   Getting from bed to chair No Help Needed   Getting dressed No Help Needed   Bathing or showering No Help Needed   Walk across the room (includes cane/walker) No Help Needed   Using the telphone No Help Needed   Taking your medications No Help Needed   Preparing meals No Help Needed   Managing money (expenses/bills) No Help Needed   Moderately strenuous housework (laundry) No Help Needed   Shopping for personal items (toiletries/medicines) No Help Needed   Shopping for groceries No Help Needed   Driving No Help Needed   Climbing a flight of stairs No Help Needed   Getting to places beyond walking distances No Help Needed      Advance Care Planning 4/5/2022   Patient's Healthcare Decision Maker is: Legal Next of Kin   Confirm Advance Directive None   Patient Would Like to Complete Advance Directive No

## 2022-04-05 NOTE — LETTER
4/5/2022 3:48 PM    Ms. Jenny Randolph  [address] [facility]      RE:  Laura Pozo Agustín   1985      Dear Dr Chen Cortes. Donnie Montejo is a patient of [address] [place] [address] with Rosamaria Jeffries MD. Please fax this patient's most recent pap and GYN notes so that we may update the patient's records for continuity of care. Our office number is [phone] if you should have any additional questions.      Our fax number: [phone]    Thank you           Sincerely,      Casandra Sarmiento MD

## 2022-04-06 LAB
25(OH)D3 SERPL-MCNC: 24.5 NG/ML (ref 30–100)
BASOPHILS # BLD: 0 K/UL (ref 0–0.1)
BASOPHILS NFR BLD: 0 % (ref 0–1)
CHOLEST SERPL-MCNC: 177 MG/DL
DIFFERENTIAL METHOD BLD: ABNORMAL
EOSINOPHIL # BLD: 0.2 K/UL (ref 0–0.4)
EOSINOPHIL NFR BLD: 4 % (ref 0–7)
ERYTHROCYTE [DISTWIDTH] IN BLOOD BY AUTOMATED COUNT: 14.7 % (ref 11.5–14.5)
EST. AVERAGE GLUCOSE BLD GHB EST-MCNC: 108 MG/DL
HBA1C MFR BLD: 5.4 % (ref 4–5.6)
HCT VFR BLD AUTO: 34 % (ref 35–47)
HDLC SERPL-MCNC: 35 MG/DL
HDLC SERPL: 5.1 {RATIO} (ref 0–5)
HGB BLD-MCNC: 11 G/DL (ref 11.5–16)
IMM GRANULOCYTES # BLD AUTO: 0 K/UL (ref 0–0.04)
IMM GRANULOCYTES NFR BLD AUTO: 0 % (ref 0–0.5)
LDLC SERPL CALC-MCNC: 83.2 MG/DL (ref 0–100)
LYMPHOCYTES # BLD: 2 K/UL (ref 0.8–3.5)
LYMPHOCYTES NFR BLD: 34 % (ref 12–49)
MCH RBC QN AUTO: 27.6 PG (ref 26–34)
MCHC RBC AUTO-ENTMCNC: 32.4 G/DL (ref 30–36.5)
MCV RBC AUTO: 85.4 FL (ref 80–99)
MONOCYTES # BLD: 0.5 K/UL (ref 0–1)
MONOCYTES NFR BLD: 8 % (ref 5–13)
NEUTS SEG # BLD: 3.2 K/UL (ref 1.8–8)
NEUTS SEG NFR BLD: 54 % (ref 32–75)
NRBC # BLD: 0 K/UL (ref 0–0.01)
NRBC BLD-RTO: 0 PER 100 WBC
PLATELET # BLD AUTO: 272 K/UL (ref 150–400)
PMV BLD AUTO: 10 FL (ref 8.9–12.9)
RBC # BLD AUTO: 3.98 M/UL (ref 3.8–5.2)
TRIGL SERPL-MCNC: 294 MG/DL (ref ?–150)
TSH SERPL DL<=0.05 MIU/L-ACNC: 0.21 UIU/ML (ref 0.36–3.74)
VLDLC SERPL CALC-MCNC: 58.8 MG/DL
WBC # BLD AUTO: 6 K/UL (ref 3.6–11)

## 2022-04-06 NOTE — PROGRESS NOTES
Labs good overall. Thyroid hormone suppressed a little on current dose pill (pill a touch strong) but ok for now at current level as long as no palpitations or tremors. Continue current regimen. Vit D a little low as well, need supplement for that. OTC 2000 units/day should be fine. Sugar was good, cholesterol was fine.

## 2022-04-26 ENCOUNTER — PATIENT MESSAGE (OUTPATIENT)
Dept: FAMILY MEDICINE CLINIC | Age: 37
End: 2022-04-26

## 2022-04-26 DIAGNOSIS — D50.9 IRON DEFICIENCY ANEMIA, UNSPECIFIED IRON DEFICIENCY ANEMIA TYPE: Primary | ICD-10-CM

## 2022-04-26 RX ORDER — FERROUS SULFATE 137(45) MG
142 TABLET, EXTENDED RELEASE ORAL
Qty: 90 TABLET | Refills: 1 | Status: SHIPPED | OUTPATIENT
Start: 2022-04-26 | End: 2022-05-04 | Stop reason: SDUPTHER

## 2022-04-26 NOTE — TELEPHONE ENCOUNTER
Shu Taylor 4/26/2022 10:49 AM EDT      ----- Message -----  From: Eliceo Campbell. Simeon Daughters  Sent: 4/26/2022 10:42 AM EDT  To: [facility] Nurses  Subject: Iron Supplement     Good Morning, I am almost out of the iron supplement I was prescribed after my csection. The amount was different than what I was prescribed during my pregnancy but as we discussed I've been taking the most recent prescribed amount. Do I need a new prescription or is this something I can get OTC? Thanks!

## 2022-05-04 DIAGNOSIS — D50.9 IRON DEFICIENCY ANEMIA, UNSPECIFIED IRON DEFICIENCY ANEMIA TYPE: ICD-10-CM

## 2022-05-04 DIAGNOSIS — O92.79 NURSING DIFFICULTY: Primary | ICD-10-CM

## 2022-05-04 RX ORDER — FERROUS SULFATE 137(45) MG
142 TABLET, EXTENDED RELEASE ORAL
Qty: 90 TABLET | Refills: 1 | Status: SHIPPED | OUTPATIENT
Start: 2022-05-04

## 2022-05-04 RX ORDER — CAL/D3/MAG11/ZINC/COP/MANG/BOR 600 MG-800
1 TABLET ORAL 2 TIMES DAILY
Qty: 180 TABLET | Refills: 1 | Status: SHIPPED | OUTPATIENT
Start: 2022-05-04

## 2022-05-04 NOTE — PROGRESS NOTES
Pt is nursing, but child having some sensitivity issues. Parent will be abstaining from dairy and soy, so will add a Calcium + VIT D suppl.

## 2022-11-11 ENCOUNTER — HOSPITAL ENCOUNTER (OUTPATIENT)
Dept: ULTRASOUND IMAGING | Age: 37
Discharge: HOME | End: 2022-11-11
Attending: OBSTETRICS & GYNECOLOGY
Payer: COMMERCIAL

## 2022-11-11 DIAGNOSIS — N93.9 ABNORMAL UTERINE BLEEDING (AUB): ICD-10-CM

## 2022-11-11 PROCEDURE — 76857 US EXAM PELVIC LIMITED: CPT

## 2022-11-18 ENCOUNTER — HOSPITAL ENCOUNTER (EMERGENCY)
Age: 37
Discharge: HOME | End: 2022-11-18
Attending: EMERGENCY MEDICINE
Payer: COMMERCIAL

## 2022-11-18 VITALS
TEMPERATURE: 98.2 F | RESPIRATION RATE: 20 BRPM | HEART RATE: 95 BPM | DIASTOLIC BLOOD PRESSURE: 103 MMHG | SYSTOLIC BLOOD PRESSURE: 176 MMHG | OXYGEN SATURATION: 98 %

## 2022-11-18 DIAGNOSIS — R10.33 ABDOMINAL PAIN, PERIUMBILICAL: Primary | ICD-10-CM

## 2022-11-18 DIAGNOSIS — S39.011A ABDOMINAL WALL STRAIN, INITIAL ENCOUNTER: ICD-10-CM

## 2022-11-18 PROCEDURE — 99282 EMERGENCY DEPT VISIT SF MDM: CPT

## 2022-11-19 NOTE — ED PROVIDER NOTES
EMERGENCY DEPARTMENT HISTORY AND PHYSICAL EXAM      Date: 11/18/2022  Patient Name: Daryl Randolph    History of Presenting Illness     Chief Complaint   Patient presents with    Abdominal Pain       History Provided By:     HPI: Jenny Hart Arts, 40 y.o. female with PMHx significant for IBS, fibromyalgia, GERD, anxiety, asthma, presents to the ED with cc of sharp periumbilical pain. Pt states that she was lifting her infant son in his carseat as well as thanksgiving groceries and felt a sharp pain in her abdomen. She has been having intermittent abdominal pain and DUB for the past few months and saw her OB who did a pelvic US which was unremarkable. Pain is worse with movement. Pt denies fever, chills, vomiting, UTI sx, diarrhea. There are no other complaints, changes, or physical findings at this time. PCP: Lala Simon MD    No current facility-administered medications on file prior to encounter. Current Outpatient Medications on File Prior to Encounter   Medication Sig Dispense Refill    ferrous sulfate (Slow Fe) 142 mg (45 mg iron) ER tablet Take 1 Tablet by mouth Daily (before breakfast). 90 Tablet 1    gti-Q9-ehr97-zinc--azam-bor (Caltrate 600-D Plus Minerals) 600 mg calcium- 800 unit-50 mg tab Take 1 Tablet by mouth two (2) times a day. 180 Tablet 1    methylcellulose (FIBER THERAPY) by Does Not Apply route. prenatal 13/ZLDM fum/folic/dha (PRENATAL-1 PO) Take  by mouth. albuterol (PROVENTIL HFA, VENTOLIN HFA, PROAIR HFA) 90 mcg/actuation inhaler Take 2 Puffs by inhalation every four (4) hours as needed for Wheezing. 18 g 3    loratadine 10 mg cap Take 10 mg by mouth daily. 100 Capsule 3    omeprazole (PRILOSEC) 20 mg capsule Take 1 Capsule by mouth daily. 90 Capsule 3    sertraline (ZOLOFT) 50 mg tablet Take 1 Tablet by mouth daily. 90 Tablet 3    montelukast (SINGULAIR) 10 mg tablet Take 1 Tablet by mouth daily.  90 Tablet 3    levothyroxine (SYNTHROID) 175 mcg tablet Take 1 Tablet by mouth daily. 90 Tablet 3    metFORMIN ER (GLUCOPHAGE XR) 500 mg tablet Take 4 Tablets by mouth daily (with dinner).  Indications: prevention of diabetes and insulin resistance 360 Tablet 3       Past History     Past Medical History:  Past Medical History:   Diagnosis Date    Abnormal Papanicolaou smear of cervix     years ago, follow up normal    Alopecia     Anemia     Anxiety     Anxiety     Asthma     Behcet's disease ([address]     Depression     taking zoloft    Fibromyalgia     Fibromyalgia     GERD (gastroesophageal reflux disease)     Gestational diabetes     Gestational hypertension     Hashimoto's thyroiditis     History of Chiari malformation     decompression surgery with titanium mesh at base of skull    IBS (irritable bowel syndrome)     IBS (irritable bowel syndrome)     Insomnia     Liver disease     ultrasound normal    Sleep apnea     Sleep apnea     Vascular abnormality     Bechet's disease    Vitamin D deficiency        Past Surgical History:  Past Surgical History:   Procedure Laterality Date    HX OTHER SURGICAL      chiari malformation repair    HX OTHER SURGICAL      exploratory rectal examination with removal of skin tag    HX TYMPANOSTOMY         Family History:  Family History   Problem Relation Age of Onset    Other Mother         Diverticulitis    Arthritis Mother     Asthma Mother     Depression Father     Heart Disease Father     Diabetes Father     Hypertension Father     Anxiety Paternal Aunt     Other Paternal Cousin         Asperger's Disorder    Alcohol abuse Maternal Cousin     Bipolar Disorder Maternal Cousin     Drug Abuse Maternal Cousin        Social History:  Social History     Tobacco Use    Smoking status: Never    Smokeless tobacco: Never   Vaping Use    Vaping Use: Never used   Substance Use Topics    Alcohol use: Not Currently     Comment: socially    Drug use: No       Allergies:  No Known Allergies      Review of Systems   Review of Systems   Gastrointestinal: Positive for abdominal pain, nausea and vomiting. Genitourinary:  Positive for vaginal bleeding. All other systems reviewed and are negative. Physical Exam   Physical Exam  Vitals and nursing note reviewed. Constitutional:       General: She is not in acute distress. Appearance: Normal appearance. HENT:      Head: Normocephalic and atraumatic. Nose: Nose normal.      Mouth/Throat:      Mouth: Mucous membranes are moist.   Eyes:      Extraocular Movements: Extraocular movements intact. Conjunctiva/sclera: Conjunctivae normal.   Cardiovascular:      Rate and Rhythm: Normal rate and regular rhythm. Pulses: Normal pulses. Heart sounds: Normal heart sounds. Pulmonary:      Effort: Pulmonary effort is normal. No respiratory distress. Breath sounds: Normal breath sounds. Abdominal:      General: Abdomen is flat. Bowel sounds are normal. There is no distension. Palpations: Abdomen is soft. Comments: Minimal pain above the umbilicus. Unable to palpate a hernia   Musculoskeletal:         General: Normal range of motion. Cervical back: Normal range of motion and neck supple. Skin:     General: Skin is warm and dry. Capillary Refill: Capillary refill takes less than 2 seconds. Neurological:      General: No focal deficit present. Mental Status: She is alert and oriented to person, place, and time. Psychiatric:         Mood and Affect: Mood normal.         Behavior: Behavior normal.         Diagnostic Study Results     Labs -   No results found for this or any previous visit (from the past 12 hour(s)). Radiologic Studies -   No orders to display     CT Results  (Last 48 hours)      None          CXR Results  (Last 48 hours)      None              Medical Decision Making   I am the first provider for this patient.     I reviewed the vital signs, available nursing notes, past medical history, past surgical history, family history and social history. Vital Signs-Reviewed the patient's vital signs. Patient Vitals for the past 12 hrs:   Temp Pulse Resp BP SpO2   11/18/22 2031 98.2 °F (36.8 °C) 95 20 (!) 176/103 98 %         ED Course:   Initial assessment performed. The patients presenting problems have been discussed, and they are in agreement with the care plan formulated and outlined with them. I have encouraged them to ask questions as they arise throughout their visit. ED Course as of 11/18/22 2139   Wypan Jacksonmaury Nov 18, 2022 2134 No peritoneal signs on exam. Pain has subsided, and occurred after lifting car seat and groceries. Suspect abdominal wall strain vs small ventral hernia. Discussed doing labs and CT scan with pt. She would like to hold off for now and will return if symptoms worsen. [SJ]      ED Course User Index  [SJ] Enoc Whatley, MD         Specific return precautions provided as well as instructions to return to the ED should sx worsen at any time. Vital signs stable for discharge. I have also put together some discharge instructions for them that include: 1) educational information regarding their diagnosis, 2) how to care for their diagnosis at home, as well a 3) list of reasons why they would want to return to the ED prior to their follow-up appointment, should their condition change. Critical Care Time:   0    Disposition:  Home    Return to ED if worse     Diagnosis     Clinical Impression:   1. Abdominal pain, periumbilical    2.  Abdominal wall strain, initial encounter

## 2022-11-19 NOTE — ED TRIAGE NOTES
Abdominal pain for months worsened today while carrying her child and groceries. Pain above umbilicus.  Pain 2/10

## 2022-11-19 NOTE — DISCHARGE INSTRUCTIONS
You may have a periumbilical hernia vs abdominal wall strain. Use a heating pad and taken motrin as needed for pain. Do not do any heavy lifting. Return to the ED if your pain worsens.

## 2022-11-21 ENCOUNTER — TELEPHONE (OUTPATIENT)
Dept: FAMILY MEDICINE CLINIC | Age: 37
End: 2022-11-21

## 2022-11-30 ENCOUNTER — OFFICE VISIT (OUTPATIENT)
Dept: FAMILY MEDICINE CLINIC | Age: 37
End: 2022-11-30
Payer: COMMERCIAL

## 2022-11-30 VITALS
RESPIRATION RATE: 20 BRPM | OXYGEN SATURATION: 96 % | HEART RATE: 102 BPM | DIASTOLIC BLOOD PRESSURE: 90 MMHG | SYSTOLIC BLOOD PRESSURE: 130 MMHG | TEMPERATURE: 97.5 F | WEIGHT: 293 LBS | BODY MASS INDEX: 45.99 KG/M2 | HEIGHT: 67 IN

## 2022-11-30 DIAGNOSIS — F41.1 GENERALIZED ANXIETY DISORDER: ICD-10-CM

## 2022-11-30 DIAGNOSIS — E03.9 ACQUIRED HYPOTHYROIDISM: ICD-10-CM

## 2022-11-30 DIAGNOSIS — R73.9 HYPERGLYCEMIA: ICD-10-CM

## 2022-11-30 DIAGNOSIS — R10.33 PERIUMBILICAL ABDOMINAL PAIN: Primary | ICD-10-CM

## 2022-11-30 DIAGNOSIS — R73.02 IGT (IMPAIRED GLUCOSE TOLERANCE): ICD-10-CM

## 2022-11-30 PROCEDURE — 99214 OFFICE O/P EST MOD 30 MIN: CPT | Performed by: FAMILY MEDICINE

## 2022-11-30 RX ORDER — LEVOTHYROXINE SODIUM 200 UG/1
200 TABLET ORAL
COMMUNITY

## 2022-11-30 NOTE — PROGRESS NOTES
Jenny Duff is a 40 y.o. female presenting for/with:    Chief Complaint   Patient presents with    Follow-up     ER f/up abdominal pain        Visit Vitals  Pulse (!) 102   Temp 97.5 °F (36.4 °C) (Temporal)   Resp 20   Ht 5' 7\" (1.702 m)   Wt (!) 371 lb 3.2 oz (168.4 kg)   SpO2 96%   BMI 58.14 kg/m²     Pain Scale: 0 - No pain/10  Pain Location:     1. \"Have you been to the ER, urgent care clinic since your last visit? Hospitalized since your last visit? \" Yes When: 11/18/2022 [facility] [place]    2. \"Have you seen or consulted any other health care providers outside of the [address] [place] [address] since your last visit? \" No     3. For patients aged 39-70: Has the patient had a colonoscopy / FIT/ Cologuard? NA - based on age      If the patient is female:    4. For patients aged 41-77: Has the patient had a mammogram within the past 2 years? No       5. For patients aged 21-65: Has the patient had a pap smear? No          Patient    Learning Assessment 5/13/2020   PRIMARY LEARNER Patient   CO-LEARNER CAREGIVER -   PRIMARY LANGUAGE ENGLISH   LEARNER PREFERENCE PRIMARY OTHER (COMMENT)   ANSWERED BY patient   RELATIONSHIP SELF     Fall Risk Assessment, last 12 mths 4/5/2022   Able to walk? Yes   Fall in past 12 months? 0   Do you feel unsteady?  0   Are you worried about falling 0       3 most recent PHQ Screens 11/30/2022   PHQ Not Done -   Little interest or pleasure in doing things Not at all   Feeling down, depressed, irritable, or hopeless Not at all   Total Score PHQ 2 0   Trouble falling or staying asleep, or sleeping too much -   Feeling tired or having little energy -   Poor appetite, weight loss, or overeating -   Feeling bad about yourself - or that you are a failure or have let yourself or your family down -   Trouble concentrating on things such as school, work, reading, or watching TV -   Moving or speaking so slowly that other people could have noticed; or the opposite being so fidgety that others notice - Thoughts of being better off dead, or hurting yourself in some way -   PHQ 9 Score -   How difficult have these problems made it for you to do your work, take care of your home and get along with others -     Abuse Screening Questionnaire 4/5/2022   Do you ever feel afraid of your partner? N   Are you in a relationship with someone who physically or mentally threatens you? N   Is it safe for you to go home?  Y       ADL Assessment 4/5/2022   Feeding yourself No Help Needed   Getting from bed to chair No Help Needed   Getting dressed No Help Needed   Bathing or showering No Help Needed   Walk across the room (includes cane/walker) No Help Needed   Using the telphone No Help Needed   Taking your medications No Help Needed   Preparing meals No Help Needed   Managing money (expenses/bills) No Help Needed   Moderately strenuous housework (laundry) No Help Needed   Shopping for personal items (toiletries/medicines) No Help Needed   Shopping for groceries No Help Needed   Driving No Help Needed   Climbing a flight of stairs No Help Needed   Getting to places beyond walking distances No Help Needed      Advance Care Planning 4/5/2022   Patient's Healthcare Decision Maker is: Legal Next of Kin   Confirm Advance Directive None   Patient Would Like to Complete Advance Directive No

## 2022-11-30 NOTE — PROGRESS NOTES
Jenny Pete is a 40 y.o. female    HPI:  Abd pain  Seen in ED 11/18/22. Presented to the ED with cc of sharp periumbilical pain. Pt states that she was lifting her infant son in his carseat as well as thanksgiving groceries and felt a sharp pain in her abdomen. ER eval did not see Acute abdomen, but no imaging done. Too She has been having intermittent abdominal pain and DUB for the past few months and saw her OB who did a pelvic US which was unremarkable. Pain is worse with movement. Pt denies fever, chills, vomiting, UTI sx, diarrhea. Hx insulin resistance  Prev mgd by diet. Working on changing to low carb (30g/meal), 150g/d. On metformin 2g/d (4x 500mg ER), has plenty. Had elev FBG's to 130's, rare 200's, hasn't checked since delivery. Is losing weight nicely. Lab Results   Component Value Date/Time    Glucose 72 01/21/2022 01:41 PM     Lab Results   Component Value Date/Time    Hemoglobin A1c 5.4 04/05/2022 03:33 PM       Elev BP's during pregnancy, but no prior hx of Hypertension. Blood pressures improving. On diet/ TLC's at this time. Management at last visit included monitoring only. Patient denies chest pain, palpitations, peripheral edema. Lab review:     Lab Results   Component Value Date/Time    Sodium 135 (L) 01/21/2022 01:41 PM    Potassium 4.2 01/21/2022 01:41 PM    Chloride 105 01/21/2022 01:41 PM    CO2 23 01/21/2022 01:41 PM    Anion gap 7 01/21/2022 01:41 PM    Glucose 72 01/21/2022 01:41 PM    BUN 12 01/21/2022 01:41 PM    Creatinine 0.60 01/21/2022 01:41 PM    BUN/Creatinine ratio 20 01/21/2022 01:41 PM    GFR est AA >60 01/21/2022 01:41 PM    GFR est non-AA >60 01/21/2022 01:41 PM    Calcium 9.0 01/21/2022 01:41 PM       Asthma and allergies  Current control: Good   Current level: mild intermittent  Current symptoms: none  Current controller: singulair 10  Current side effects: none  Last flareup: fall 2020.   Number of flareups in past year: none  Current symptom relief med: albuterol HFA    Anxiety/Depression hx  Doing well on sertraline 50mg/d. No sx of post-partum depression lately. Did not have sx since delivery. 3 most recent PHQ Screens 11/30/2022   PHQ Not Done -   Little interest or pleasure in doing things Not at all   Feeling down, depressed, irritable, or hopeless Not at all   Total Score PHQ 2 0   Trouble falling or staying asleep, or sleeping too much -   Feeling tired or having little energy -   Poor appetite, weight loss, or overeating -   Feeling bad about yourself - or that you are a failure or have let yourself or your family down -   Trouble concentrating on things such as school, work, reading, or watching TV -   Moving or speaking so slowly that other people could have noticed; or the opposite being so fidgety that others notice -   Thoughts of being better off dead, or hurting yourself in some way -   PHQ 9 Score -   How difficult have these problems made it for you to do your work, take care of your home and get along with others -     PMH, SH, Medications/Allergies: reviewed, on chart. Current Outpatient Medications   Medication Sig    levothyroxine (SYNTHROID) 200 mcg tablet Take 200 mcg by mouth Daily (before breakfast). ferrous sulfate (Slow Fe) 142 mg (45 mg iron) ER tablet Take 1 Tablet by mouth Daily (before breakfast). hys-Q7-yhx28-zinc--azam-bor (Caltrate 600-D Plus Minerals) 600 mg calcium- 800 unit-50 mg tab Take 1 Tablet by mouth two (2) times a day. methylcellulose (FIBER THERAPY) by Does Not Apply route. prenatal 98/XOOS fum/folic/dha (PRENATAL-1 PO) Take  by mouth. albuterol (PROVENTIL HFA, VENTOLIN HFA, PROAIR HFA) 90 mcg/actuation inhaler Take 2 Puffs by inhalation every four (4) hours as needed for Wheezing.    loratadine 10 mg cap Take 10 mg by mouth daily. montelukast (SINGULAIR) 10 mg tablet Take 1 Tablet by mouth daily. metFORMIN ER (GLUCOPHAGE XR) 500 mg tablet Take 4 Tablets by mouth daily (with dinner). Indications: prevention of diabetes and insulin resistance    omeprazole (PRILOSEC) 20 mg capsule Take 1 Capsule by mouth daily. (Patient not taking: Reported on 11/30/2022)    levothyroxine (SYNTHROID) 175 mcg tablet Take 1 Tablet by mouth daily. (Patient not taking: Reported on 11/30/2022)     No current facility-administered medications for this visit. ROS:  Constitutional: No fever, chills or abnormal weight loss  Respiratory: No cough, SOB   CV: No chest pain or Palpitations    Visit Vitals  BP (!) 130/90 (BP 1 Location: Left upper arm, BP Patient Position: Sitting, BP Cuff Size: Thigh)   Pulse (!) 102   Temp 97.5 °F (36.4 °C) (Temporal)   Resp 20   Ht 5' 7\" (1.702 m)   Wt (!) 371 lb 3.2 oz (168.4 kg)   SpO2 96%   BMI 58.14 kg/m²       Wt Readings from Last 3 Encounters:   11/30/22 (!) 371 lb 3.2 oz (168.4 kg)   04/05/22 (!) 365 lb 9.6 oz (165.8 kg)   01/21/22 (!) 378 lb (171.5 kg)     BP Readings from Last 3 Encounters:   11/30/22 (!) 130/90   11/18/22 (!) 176/103   04/05/22 128/74     Physical Examination: General appearance - alert, well appearing, and in no distress  Mental status - alert, oriented to person, place, and time  Eyes - pupils equal and reactive, extraocular eye movements intact  ENT - bilateral external ears and nose normal. Normal lips  Neck - supple, no significant adenopathy, no thyromegaly or mass  Chest - clear to auscultation, no wheezes, rales or rhonchi, symmetric air entry  Heart - normal rate, regular rhythm, normal S1, S2, no murmurs, rubs, clicks or gallops  Abd - NABS. SNT, no HSM/Mass. No identifiable hernia seen. Extremities - peripheral pulses normal, no pedal edema, no clubbing or cyanosis    A/P:  Periumbilical abd pain  No obvious sign hernia. Monitor, consider imaging if more sx. IGT  Doing well on metformin. Last labs ok. Adjust tx PRN, if good, con't metformin at current doses. HX HTN  Borderline now.  Monitor, work on healthy lifestyle changes, consider adding thiazide. Asthma and allergies  well controlled. con't current tx. Hypothyroid  well controlled on synthroid 200mcg. Check labs, adj PRN. Lab Results   Component Value Date/Time    TSH 0.21 (L) 04/05/2022 03:33 PM     Depression and anxiety  well controlled off sertraline. Monitor for recurrence. Vit D deficiency  Check labs, adj PRN. Overweight, with BMI 58  Worsening again. Thinking of getting pregnant again, so GLP1's not ideal.Will monitor and gently encourage pt to con't healthy lifestyle changes.     F/U 6mo/ PRN

## 2023-03-08 ENCOUNTER — OFFICE VISIT (OUTPATIENT)
Dept: FAMILY MEDICINE CLINIC | Age: 38
End: 2023-03-08
Payer: COMMERCIAL

## 2023-03-08 VITALS — HEART RATE: 92 BPM | RESPIRATION RATE: 20 BRPM | OXYGEN SATURATION: 92 % | TEMPERATURE: 97.8 F

## 2023-03-08 DIAGNOSIS — R05.9 COUGH, UNSPECIFIED TYPE: ICD-10-CM

## 2023-03-08 DIAGNOSIS — J06.9 UPPER RESPIRATORY INFECTION, ACUTE: Primary | ICD-10-CM

## 2023-03-08 LAB
EXP DATE SOLUTION: NORMAL
EXP DATE SWAB: NORMAL
FLUAV+FLUBV AG NOSE QL IA.RAPID: NEGATIVE
FLUAV+FLUBV AG NOSE QL IA.RAPID: NEGATIVE
LOT NUMBER SOLUTION: NORMAL
LOT NUMBER SWAB: NORMAL
SARS-COV-2 RNA POC: NEGATIVE
VALID INTERNAL CONTROL?: YES

## 2023-03-08 PROCEDURE — 99213 OFFICE O/P EST LOW 20 MIN: CPT | Performed by: FAMILY MEDICINE

## 2023-03-08 PROCEDURE — 87635 SARS-COV-2 COVID-19 AMP PRB: CPT | Performed by: FAMILY MEDICINE

## 2023-03-08 PROCEDURE — 87502 INFLUENZA DNA AMP PROBE: CPT | Performed by: FAMILY MEDICINE

## 2023-03-08 RX ORDER — PREDNISONE 20 MG/1
TABLET ORAL
Qty: 11 TABLET | Refills: 0 | Status: SHIPPED | OUTPATIENT
Start: 2023-03-08

## 2023-03-08 NOTE — PROGRESS NOTES
Jenny Ram is a 40 y.o. female  Chief Complaint   Patient presents with    Cough     C/o non productive cough x 1 week . .. some wheezing states she has had to use inhaler     Nasal Congestion     Congestion yellow in color     Headache     PT seen curbside 2nd pandemic    HPI:  Symptoms include cough, chest congestion, post-nasal drip for past week. Staying about he same. No dyspnea or fever >100F. Evaluation to date: none. Treatment to date: rest, fluids, mucinex. PMH, SH, Medications/Allergies: reviewed, on chart. Current Outpatient Medications   Medication Sig    levothyroxine (SYNTHROID) 200 mcg tablet Take 200 mcg by mouth Daily (before breakfast). ferrous sulfate (Slow Fe) 142 mg (45 mg iron) ER tablet Take 1 Tablet by mouth Daily (before breakfast). xow-[id number]-[id number]-zinc--azam-bor (Caltrate 600-D Plus Minerals) 600 mg calcium- 800 unit-50 mg tab Take 1 Tablet by mouth two (2) times a day. methylcellulose (FIBER THERAPY) by Does Not Apply route. prenatal 67/HOZH fum/folic/dha (PRENATAL-1 PO) Take  by mouth. albuterol (PROVENTIL HFA, VENTOLIN HFA, PROAIR HFA) 90 mcg/actuation inhaler Take 2 Puffs by inhalation every four (4) hours as needed for Wheezing.    loratadine 10 mg cap Take 10 mg by mouth daily. montelukast (SINGULAIR) 10 mg tablet Take 1 Tablet by mouth daily. metFORMIN ER (GLUCOPHAGE XR) 500 mg tablet Take 4 Tablets by mouth daily (with dinner). Indications: prevention of diabetes and insulin resistance    omeprazole (PRILOSEC) 20 mg capsule Take 1 Capsule by mouth daily. (Patient not taking: No sig reported)    levothyroxine (SYNTHROID) 175 mcg tablet Take 1 Tablet by mouth daily. (Patient not taking: No sig reported)     No current facility-administered medications for this visit.       ROS:  Constitutional: No fever, chills or abnormal weight loss  Respiratory: No cough, SOB   CV: No chest pain or Palpitations    Visit Vitals  Pulse 92   Temp 97.8 °F (36.6 °C) (Temporal)   Resp 20   SpO2 92%     Wt Readings from Last 3 Encounters:   11/30/22 (!) 371 lb 3.2 oz (168.4 kg)   04/05/22 (!) 365 lb 9.6 oz (165.8 kg)   01/21/22 (!) 378 lb (171.5 kg)     BP Readings from Last 3 Encounters:   11/30/22 (!) 130/90   11/18/22 (!) 176/103   04/05/22 128/74     Physical Examination: General appearance - alert, well appearing, and in no distress  Mental status - alert, oriented to person, place, and time  Eyes - pupils equal and reactive, extraocular eye movements intact  ENT - bilateral external ears and nose normal. Normal lips  Neck - supple, no significant adenopathy, no thyromegaly or mass  Chest - clear to auscultation, no wheezes, rales or rhonchi, symmetric air entry  Heart - normal rate, regular rhythm, normal S1, S2, no murmurs, rubs, clicks or gallops  Extremities - peripheral pulses normal, no pedal edema, no clubbing or cyanosis    Results for orders placed or performed in visit on 03/08/23   AMB POC COVID-19 COV   Result Value Ref Range    SARS-COV-2 RNA POC Negative Negative    LOT NUMBER SWAB [id number]     EXP DATE SWAB 07/31/2025     LOT NUMBER SOLUTION [id number]     EXP DATE SOLUTION 12/29/2023    AMB POC INFLUENZA A  AND B REAL-TIME RT-PCR   Result Value Ref Range    VALID INTERNAL CONTROL POC Yes     Influenza A Ag POC Negative Negative    Influenza B Ag POC Negative Negative     A/p:  URI with cough  Tx with tessalon perles, prednisone taper, otc mucinex and tessalon (has). Monitor for worsening and let us know. Consider CXR.     Follow up 5/2023 for labs and sugar

## 2023-03-08 NOTE — PROGRESS NOTES
Jenny Bronson is a 40 y.o. female presenting for/with:    Chief Complaint   Patient presents with    Cough     C/o non productive cough x 1 week . .. some wheezing states she has had to use inhaler     Nasal Congestion     Congestion yellow in color     Headache       Visit Vitals  Pulse 92   Temp 97.8 °F (36.6 °C) (Temporal)   Resp 20   SpO2 92%     Pain Scale: 0 - No pain/10  Pain Location:     1. \"Have you been to the ER, urgent care clinic since your last visit? Hospitalized since your last visit? \" No    2. \"Have you seen or consulted any other health care providers outside of the [address] [place] [address] since your last visit? \" No     3. For patients aged 39-70: Has the patient had a colonoscopy / FIT/ Cologuard? NA - based on age      If the patient is female:    4. For patients aged 41-77: Has the patient had a mammogram within the past 2 years? NA - based on age or sex      11. For patients aged 21-65: Has the patient had a pap smear? Yes - Care Gap present. Most recent result on file          Patient    Learning Assessment 5/13/2020   PRIMARY LEARNER Patient   CO-LEARNER CAREGIVER -   PRIMARY LANGUAGE ENGLISH   LEARNER PREFERENCE PRIMARY OTHER (COMMENT)   ANSWERED BY patient   RELATIONSHIP SELF     Fall Risk Assessment, last 12 mths 4/5/2022   Able to walk? Yes   Fall in past 12 months? 0   Do you feel unsteady?  0   Are you worried about falling 0       3 most recent PHQ Screens 3/8/2023   PHQ Not Done -   Little interest or pleasure in doing things Not at all   Feeling down, depressed, irritable, or hopeless Not at all   Total Score PHQ 2 0   Trouble falling or staying asleep, or sleeping too much -   Feeling tired or having little energy -   Poor appetite, weight loss, or overeating -   Feeling bad about yourself - or that you are a failure or have let yourself or your family down -   Trouble concentrating on things such as school, work, reading, or watching TV -   Moving or speaking so slowly that other people could have noticed; or the opposite being so fidgety that others notice -   Thoughts of being better off dead, or hurting yourself in some way -   PHQ 9 Score -   How difficult have these problems made it for you to do your work, take care of your home and get along with others -     Abuse Screening Questionnaire 4/5/2022   Do you ever feel afraid of your partner? N   Are you in a relationship with someone who physically or mentally threatens you? N   Is it safe for you to go home?  Y       ADL Assessment 4/5/2022   Feeding yourself No Help Needed   Getting from bed to chair No Help Needed   Getting dressed No Help Needed   Bathing or showering No Help Needed   Walk across the room (includes cane/walker) No Help Needed   Using the telphone No Help Needed   Taking your medications No Help Needed   Preparing meals No Help Needed   Managing money (expenses/bills) No Help Needed   Moderately strenuous housework (laundry) No Help Needed   Shopping for personal items (toiletries/medicines) No Help Needed   Shopping for groceries No Help Needed   Driving No Help Needed   Climbing a flight of stairs No Help Needed   Getting to places beyond walking distances No Help Needed      Advance Care Planning 4/5/2022   Patient's Healthcare Decision Maker is: Legal Next of Kin   Confirm Advance Directive None   Patient Would Like to Complete Advance Directive No

## 2023-03-08 NOTE — LETTER
NOTIFICATION RETURN TO WORK / SCHOOL    3/8/2023 3:35 PM    Ms. Jenny Randolph  [address] [facility] [address] [facility]      To Whom It May Concern:    Jenny Mendieta is currently under the care of Hosea Heike. She will return to work/school on: 3/10/2023    If there are questions or concerns please have the patient contact our office.         Sincerely,      Milan Miller MD

## 2023-03-24 DIAGNOSIS — J30.9 CHRONIC ALLERGIC RHINITIS: ICD-10-CM

## 2023-03-24 DIAGNOSIS — R73.9 HYPERGLYCEMIA: ICD-10-CM

## 2023-03-24 RX ORDER — METFORMIN HYDROCHLORIDE 500 MG/1
TABLET, EXTENDED RELEASE ORAL
Qty: 360 TABLET | Refills: 1 | Status: SHIPPED | OUTPATIENT
Start: 2023-03-24

## 2023-03-24 RX ORDER — MONTELUKAST SODIUM 10 MG/1
10 TABLET ORAL DAILY
Qty: 90 TABLET | Refills: 3 | Status: SHIPPED | OUTPATIENT
Start: 2023-03-24

## 2023-04-06 ENCOUNTER — OFFICE VISIT (OUTPATIENT)
Dept: FAMILY MEDICINE CLINIC | Age: 38
End: 2023-04-06
Payer: COMMERCIAL

## 2023-04-06 PROCEDURE — 87880 STREP A ASSAY W/OPTIC: CPT | Performed by: INTERNAL MEDICINE

## 2023-04-06 PROCEDURE — 99213 OFFICE O/P EST LOW 20 MIN: CPT | Performed by: INTERNAL MEDICINE

## 2023-04-06 PROCEDURE — 87635 SARS-COV-2 COVID-19 AMP PRB: CPT | Performed by: INTERNAL MEDICINE

## 2023-04-06 PROCEDURE — 87804 INFLUENZA ASSAY W/OPTIC: CPT | Performed by: INTERNAL MEDICINE

## 2023-04-06 NOTE — PROGRESS NOTES
Chief Complaint   Patient presents with    Cough     Congestion, barking cough    Fever     X 2 days. Chills, fever up to 103, denies body aches    Vomiting     yesterday       ASSESSMENT AND PLAN:    1. Fever, unspecified fever cause-this is likely norovirus. She is encouraged to drink plenty of fluids and use extra strength Tylenol  - AMB POC COVID-19 COV  - AMB POC RAPID STREP A  - AMB POC RAPID INFLUENZA TEST    2. Cough, unspecified type  - AMB POC COVID-19 COV  - AMB POC RAPID STREP A  - AMB POC RAPID INFLUENZA TEST    3. Vomiting, unspecified vomiting type, unspecified whether nausea present  Liquid Pepto-Bismol for now. Discontinue metformin for at least 4 days. If symptoms worsen return to clinic or go to the emergency room. - AMB POC COVID-19 COV  - AMB POC RAPID STREP A  - AMB POC RAPID INFLUENZA TEST      Orders Placed This Encounter    AMB POC COVID-19 COV     Order Specific Question:   Is this test for diagnosis or screening? Answer:   Diagnosis of ill patient     Order Specific Question:   Symptomatic for COVID-19 as defined by CDC? Answer:   Yes     Order Specific Question:   Date of Symptom Onset     Answer:   4/4/2023     Order Specific Question:   Hospitalized for COVID-19? Answer:   No     Order Specific Question:   Admitted to ICU for COVID-19? Answer:   No     Order Specific Question:   Employed in healthcare setting? Answer:   No     Order Specific Question:   Resident in a congregate (group) care setting? Answer:   No     Order Specific Question:   Pregnant? Answer:   No     Order Specific Question:   Previously tested for COVID-19? Answer:   Unknown    AMB POC RAPID STREP A    AMB POC RAPID INFLUENZA TEST (A and B Result)       Kendell Thapa MD, FACP      HPI:         is a 40 y.o. female who arrives for nausea and projectile vomiting.   Approximately 12 hours ago she awoke with severe nausea and experienced severe projectile vomiting on 3 occasions and now has had some loose watery stools. She was recently exposed to a small child several days ago with similar symptoms. No Known Allergies    Current Outpatient Medications   Medication Sig    metFORMIN ER (GLUCOPHAGE XR) 500 mg tablet Take 4 tablets by mouth daily at dinner. Indications: prevention of diabetes and insulin resistance. montelukast (SINGULAIR) 10 mg tablet Take 1 tablet by mouth daily. levothyroxine (SYNTHROID) 200 mcg tablet Take 1 Tablet by mouth Daily (before breakfast). ferrous sulfate (Slow Fe) 142 mg (45 mg iron) ER tablet Take 1 Tablet by mouth Daily (before breakfast). methylcellulose (FIBER THERAPY) by Does Not Apply route. prenatal 19/EIRR fum/folic/dha (PRENATAL-1 PO) Take  by mouth. albuterol (PROVENTIL HFA, VENTOLIN HFA, PROAIR HFA) 90 mcg/actuation inhaler Take 2 Puffs by inhalation every four (4) hours as needed for Wheezing.    loratadine 10 mg cap Take 10 mg by mouth daily. levothyroxine (SYNTHROID) 175 mcg tablet Take 1 Tablet by mouth daily. No current facility-administered medications for this visit. Past Medical History:   Diagnosis Date    Abnormal Papanicolaou smear of cervix     years ago, follow up normal    Alopecia     Anemia     Anxiety     Anxiety     Asthma     Behcet's disease ([facility] [address]     Depression     taking zoloft    Fibromyalgia     Fibromyalgia     GERD (gastroesophageal reflux disease)     Gestational diabetes     Gestational hypertension     Hashimoto's thyroiditis     History of Chiari malformation     decompression surgery with titanium mesh at base of skull    IBS (irritable bowel syndrome)     IBS (irritable bowel syndrome)     Insomnia     Liver disease     ultrasound normal    Sleep apnea     Sleep apnea     Vascular abnormality     Bechet's disease    Vitamin D deficiency          ROS:   Denies wt loss, wt gain, hemoptysis, hematochezia or melena.     Physical Examination:    Visit Vitals  Pulse (!) 108   Temp 97.1 °F (36.2 °C) (Temporal)   Resp 18   SpO2 92%      General:  Alert, cooperative, no distress. Head:  Normocephalic, without obvious abnormality, atraumatic. Eyes:  Conjunctivae/corneas clear. Pupils equal, round, reactive to light. Chest: No wheezes, rales. Rubs or ronchi   Cardiac: RRR.  No peripheral edema     Skin: No rash

## 2023-04-06 NOTE — PROGRESS NOTES
Jenny Emerson is a 40 y.o. female presenting for/with:    Chief Complaint   Patient presents with    Cough     Congestion, barking cough    Fever     X 2 days. Chills, fever up to 103, denies body aches    Vomiting     yesterday       Visit Vitals  Pulse (!) 108   Temp 97.1 °F (36.2 °C) (Temporal)   Resp 18   SpO2 92%     Pain Scale: /10  Pain Location:     1. \"Have you been to the ER, urgent care clinic since your last visit? Hospitalized since your last visit? \" No    2. \"Have you seen or consulted any other health care providers outside of the [address] [place] [address] since your last visit? \" No     3. For patients aged 39-70: Has the patient had a colonoscopy / FIT/ Cologuard? NA - based on age      If the patient is female:    4. For patients aged 41-77: Has the patient had a mammogram within the past 2 years? NA - based on age or sex      11. For patients aged 21-65: Has the patient had a pap smear? NA - based on age or sex      Symptom review:  Learning Assessment 5/13/2020   PRIMARY LEARNER Patient   CO-LEARNER CAREGIVER -   PRIMARY LANGUAGE ENGLISH   LEARNER PREFERENCE PRIMARY OTHER (COMMENT)   ANSWERED BY patient   RELATIONSHIP SELF     Fall Risk Assessment, last 12 mths 4/6/2023   Able to walk? Yes   Fall in past 12 months? 0   Do you feel unsteady?  0   Are you worried about falling 0       3 most recent PHQ Screens 3/8/2023   PHQ Not Done -   Little interest or pleasure in doing things Not at all   Feeling down, depressed, irritable, or hopeless Not at all   Total Score PHQ 2 0   Trouble falling or staying asleep, or sleeping too much -   Feeling tired or having little energy -   Poor appetite, weight loss, or overeating -   Feeling bad about yourself - or that you are a failure or have let yourself or your family down -   Trouble concentrating on things such as school, work, reading, or watching TV -   Moving or speaking so slowly that other people could have noticed; or the opposite being so fidgety that others notice -   Thoughts of being better off dead, or hurting yourself in some way -   PHQ 9 Score -   How difficult have these problems made it for you to do your work, take care of your home and get along with others -     Abuse Screening Questionnaire 4/6/2023   Do you ever feel afraid of your partner? N   Are you in a relationship with someone who physically or mentally threatens you? N   Is it safe for you to go home?  Y       ADL Assessment 4/6/2023   Feeding yourself No Help Needed   Getting from bed to chair No Help Needed   Getting dressed No Help Needed   Bathing or showering No Help Needed   Walk across the room (includes cane/walker) No Help Needed   Using the telphone No Help Needed   Taking your medications No Help Needed   Preparing meals No Help Needed   Managing money (expenses/bills) No Help Needed   Moderately strenuous housework (laundry) No Help Needed   Shopping for personal items (toiletries/medicines) No Help Needed   Shopping for groceries No Help Needed   Driving No Help Needed   Climbing a flight of stairs No Help Needed   Getting to places beyond walking distances No Help Needed      Advance Care Planning 4/6/2023   Patient's Healthcare Decision Maker is: Named in scanned ACP document   Confirm Advance Directive Yes, on file   Patient Would Like to Complete Advance Directive -

## 2023-04-07 ENCOUNTER — TELEPHONE (OUTPATIENT)
Dept: FAMILY MEDICINE CLINIC | Age: 38
End: 2023-04-07

## 2023-05-08 ENCOUNTER — HOSPITAL ENCOUNTER (OUTPATIENT)
Facility: HOSPITAL | Age: 38
Discharge: HOME | End: 2023-05-11
Payer: COMMERCIAL

## 2023-05-08 ENCOUNTER — TELEPHONE (OUTPATIENT)
Age: 38
End: 2023-05-08

## 2023-05-08 DIAGNOSIS — R05.2 SUBACUTE COUGH: ICD-10-CM

## 2023-05-08 DIAGNOSIS — J45.20 MILD INTERMITTENT ASTHMA WITHOUT COMPLICATION: ICD-10-CM

## 2023-05-08 DIAGNOSIS — R05.2 SUBACUTE COUGH: Primary | ICD-10-CM

## 2023-05-08 PROCEDURE — 36415 COLL VENOUS BLD VENIPUNCTURE: CPT

## 2023-05-08 PROCEDURE — 71046 X-RAY EXAM CHEST 2 VIEWS: CPT

## 2023-05-08 PROCEDURE — 85025 COMPLETE CBC W/AUTO DIFF WBC: CPT

## 2023-05-08 RX ORDER — BENZONATATE 200 MG/1
200 CAPSULE ORAL 3 TIMES DAILY PRN
Qty: 30 CAPSULE | Refills: 0 | Status: SHIPPED | OUTPATIENT
Start: 2023-05-08

## 2023-05-08 RX ORDER — PREDNISONE 20 MG/1
20 TABLET ORAL DAILY
Qty: 5 TABLET | Refills: 0 | Status: SHIPPED | OUTPATIENT
Start: 2023-05-08 | End: 2023-05-13

## 2023-05-08 NOTE — TELEPHONE ENCOUNTER
Please call pt at [phone]    Re: cough x 2 months/pt went to urgent care 2 wks ago dx: bronchitis.  Pt requesting appointment/could not schedule due to no available

## 2023-05-09 LAB
BASOPHILS # BLD: 0.2 K/UL (ref 0–0.1)
BASOPHILS NFR BLD: 2 % (ref 0–1)
DIFFERENTIAL METHOD BLD: ABNORMAL
EOSINOPHIL # BLD: 0.3 K/UL (ref 0–0.4)
EOSINOPHIL NFR BLD: 3 % (ref 0–7)
ERYTHROCYTE [DISTWIDTH] IN BLOOD BY AUTOMATED COUNT: 14.3 % (ref 11.5–14.5)
HCT VFR BLD AUTO: 40.7 % (ref 35–47)
HGB BLD-MCNC: 13 G/DL (ref 11.5–16)
IMM GRANULOCYTES # BLD AUTO: 0 K/UL
IMM GRANULOCYTES NFR BLD AUTO: 0 %
LYMPHOCYTES # BLD: 2.6 K/UL (ref 0.8–3.5)
LYMPHOCYTES NFR BLD: 30 % (ref 12–49)
MCH RBC QN AUTO: 29.5 PG (ref 26–34)
MCHC RBC AUTO-ENTMCNC: 31.9 G/DL (ref 30–36.5)
MCV RBC AUTO: 92.3 FL (ref 80–99)
MONOCYTES # BLD: 0.6 K/UL (ref 0–1)
MONOCYTES NFR BLD: 7 % (ref 5–13)
NEUTS SEG # BLD: 4.9 K/UL (ref 1.8–8)
NEUTS SEG NFR BLD: 58 % (ref 32–75)
NRBC # BLD: 0 K/UL (ref 0–0.01)
NRBC BLD-RTO: 0 PER 100 WBC
PLATELET # BLD AUTO: 289 K/UL (ref 150–400)
PMV BLD AUTO: 10.7 FL (ref 8.9–12.9)
RBC # BLD AUTO: 4.41 M/UL (ref 3.8–5.2)
RBC MORPH BLD: ABNORMAL
WBC # BLD AUTO: 8.6 K/UL (ref 3.6–11)
WBC MORPH BLD: ABNORMAL

## 2023-05-09 NOTE — RESULT ENCOUNTER NOTE
Labs good, no sign severe infection or anemia. Your cough and fatigue are unlikely to be from those. You are probably losing sleep from the cough. We also should consider sleep apnea as a possible problem if the symptoms of fatigue don't improve but the cough does.

## 2023-05-30 ENCOUNTER — OFFICE VISIT (OUTPATIENT)
Age: 38
End: 2023-05-30
Payer: COMMERCIAL

## 2023-05-30 VITALS
SYSTOLIC BLOOD PRESSURE: 130 MMHG | RESPIRATION RATE: 18 BRPM | BODY MASS INDEX: 45.99 KG/M2 | HEART RATE: 97 BPM | OXYGEN SATURATION: 99 % | HEIGHT: 67 IN | WEIGHT: 293 LBS | DIASTOLIC BLOOD PRESSURE: 84 MMHG | TEMPERATURE: 99.6 F

## 2023-05-30 DIAGNOSIS — J45.31 MILD PERSISTENT ASTHMA WITH ACUTE EXACERBATION: Primary | ICD-10-CM

## 2023-05-30 DIAGNOSIS — E03.8 HYPOTHYROIDISM DUE TO HASHIMOTO'S THYROIDITIS: ICD-10-CM

## 2023-05-30 DIAGNOSIS — E06.3 HYPOTHYROIDISM DUE TO HASHIMOTO'S THYROIDITIS: ICD-10-CM

## 2023-05-30 DIAGNOSIS — Z71.89 PRENATAL CONSULT: ICD-10-CM

## 2023-05-30 DIAGNOSIS — R05.2 SUBACUTE COUGH: ICD-10-CM

## 2023-05-30 PROCEDURE — 99214 OFFICE O/P EST MOD 30 MIN: CPT | Performed by: FAMILY MEDICINE

## 2023-05-30 RX ORDER — FLUTICASONE PROPIONATE 50 MCG
SPRAY, SUSPENSION (ML) NASAL
COMMUNITY
End: 2023-05-30

## 2023-05-30 RX ORDER — ERGOCALCIFEROL 1.25 MG/1
50000 CAPSULE ORAL
COMMUNITY
End: 2023-05-30

## 2023-05-30 RX ORDER — ADAPALENE 45 G/G
GEL TOPICAL NIGHTLY
COMMUNITY
End: 2023-05-30 | Stop reason: ALTCHOICE

## 2023-05-30 RX ORDER — BUPROPION HYDROCHLORIDE 300 MG/1
300 TABLET ORAL DAILY
COMMUNITY
Start: 2016-06-23 | End: 2023-05-30 | Stop reason: SINTOL

## 2023-05-30 RX ORDER — HYDROCODONE POLISTIREX AND CHLORPHENIRAMINE POLISTIREX 10; 8 MG/5ML; MG/5ML
SUSPENSION, EXTENDED RELEASE ORAL
COMMUNITY
End: 2023-05-30 | Stop reason: ALTCHOICE

## 2023-05-30 RX ORDER — CALCIUM POLYCARBOPHIL 625 MG
625 TABLET ORAL DAILY
COMMUNITY

## 2023-05-30 RX ORDER — LANSOPRAZOLE 15 MG/1
15 CAPSULE, DELAYED RELEASE ORAL DAILY
COMMUNITY
End: 2023-05-30 | Stop reason: ALTCHOICE

## 2023-05-30 RX ORDER — TRAZODONE HYDROCHLORIDE 50 MG/1
50 TABLET ORAL NIGHTLY
COMMUNITY
End: 2023-05-30 | Stop reason: ALTCHOICE

## 2023-05-30 RX ORDER — CITALOPRAM 40 MG/1
40 TABLET ORAL DAILY
COMMUNITY
Start: 2016-06-23 | End: 2023-05-30

## 2023-05-30 RX ORDER — DEXTROMETHORPHAN HYDROBROMIDE AND PROMETHAZINE HYDROCHLORIDE 15; 6.25 MG/5ML; MG/5ML
SYRUP ORAL
COMMUNITY
Start: 2023-04-23 | End: 2023-05-30 | Stop reason: ALTCHOICE

## 2023-05-30 RX ORDER — BUDESONIDE AND FORMOTEROL FUMARATE DIHYDRATE 160; 4.5 UG/1; UG/1
2 AEROSOL RESPIRATORY (INHALATION)
COMMUNITY
End: 2023-05-30

## 2023-05-30 RX ORDER — CLINDAMYCIN AND BENZOYL PEROXIDE 10; 50 MG/G; MG/G
GEL TOPICAL 2 TIMES DAILY
COMMUNITY
End: 2023-05-30

## 2023-05-30 RX ORDER — PRENATAL VIT/IRON FUM/FOLIC AC 27MG-0.8MG
1 TABLET ORAL DAILY
Qty: 100 TABLET | Refills: 3 | Status: SHIPPED | OUTPATIENT
Start: 2023-05-30

## 2023-05-30 RX ORDER — PREDNISONE 20 MG/1
TABLET ORAL
Qty: 18 TABLET | Refills: 0 | Status: SHIPPED | OUTPATIENT
Start: 2023-05-30 | End: 2023-06-08

## 2023-05-30 RX ORDER — HYDROCODONE BITARTRATE AND ACETAMINOPHEN 5; 325 MG/1; MG/1
1-2 TABLET ORAL EVERY 6 HOURS PRN
COMMUNITY
Start: 2018-02-22 | End: 2023-05-30

## 2023-05-30 RX ORDER — LEVOFLOXACIN 500 MG/1
TABLET, FILM COATED ORAL
COMMUNITY
End: 2023-05-30 | Stop reason: ALTCHOICE

## 2023-05-30 RX ORDER — IBUPROFEN 600 MG/1
600 TABLET ORAL EVERY 6 HOURS PRN
COMMUNITY
Start: 2018-02-22 | End: 2023-05-30 | Stop reason: ALTCHOICE

## 2023-05-30 RX ORDER — ALBUTEROL SULFATE 90 UG/1
2 AEROSOL, METERED RESPIRATORY (INHALATION) 4 TIMES DAILY PRN
Qty: 18 G | Refills: 5
Start: 2023-05-30

## 2023-05-30 RX ORDER — PSEUDOEPHEDRINE HCL 120 MG/1
TABLET, FILM COATED, EXTENDED RELEASE ORAL
COMMUNITY
End: 2023-05-30 | Stop reason: ALTCHOICE

## 2023-05-30 RX ORDER — DOXYCYCLINE 100 MG/1
CAPSULE ORAL
COMMUNITY
Start: 2023-04-23 | End: 2023-05-30

## 2023-05-30 RX ORDER — NEOMYCIN SULFATE, POLYMYXIN B SULFATE, HYDROCORTISONE 3.5; 10000; 1 MG/ML; [USP'U]/ML; MG/ML
SOLUTION/ DROPS AURICULAR (OTIC)
COMMUNITY
End: 2023-05-30 | Stop reason: ALTCHOICE

## 2023-05-30 RX ORDER — DEXLANSOPRAZOLE 60 MG/1
CAPSULE, DELAYED RELEASE ORAL
COMMUNITY

## 2023-05-30 SDOH — ECONOMIC STABILITY: INCOME INSECURITY: HOW HARD IS IT FOR YOU TO PAY FOR THE VERY BASICS LIKE FOOD, HOUSING, MEDICAL CARE, AND HEATING?: NOT HARD AT ALL

## 2023-05-30 SDOH — ECONOMIC STABILITY: HOUSING INSECURITY
IN THE LAST 12 MONTHS, WAS THERE A TIME WHEN YOU DID NOT HAVE A STEADY PLACE TO SLEEP OR SLEPT IN A SHELTER (INCLUDING NOW)?: NO

## 2023-05-30 SDOH — ECONOMIC STABILITY: FOOD INSECURITY: WITHIN THE PAST 12 MONTHS, YOU WORRIED THAT YOUR FOOD WOULD RUN OUT BEFORE YOU GOT MONEY TO BUY MORE.: NEVER TRUE

## 2023-05-30 SDOH — ECONOMIC STABILITY: FOOD INSECURITY: WITHIN THE PAST 12 MONTHS, THE FOOD YOU BOUGHT JUST DIDN'T LAST AND YOU DIDN'T HAVE MONEY TO GET MORE.: NEVER TRUE

## 2023-05-30 ASSESSMENT — PATIENT HEALTH QUESTIONNAIRE - PHQ9
1. LITTLE INTEREST OR PLEASURE IN DOING THINGS: 0
SUM OF ALL RESPONSES TO PHQ QUESTIONS 1-9: 0
7. TROUBLE CONCENTRATING ON THINGS, SUCH AS READING THE NEWSPAPER OR WATCHING TELEVISION: 0
6. FEELING BAD ABOUT YOURSELF - OR THAT YOU ARE A FAILURE OR HAVE LET YOURSELF OR YOUR FAMILY DOWN: 0
9. THOUGHTS THAT YOU WOULD BE BETTER OFF DEAD, OR OF HURTING YOURSELF: 0
SUM OF ALL RESPONSES TO PHQ QUESTIONS 1-9: 0
3. TROUBLE FALLING OR STAYING ASLEEP: 0
4. FEELING TIRED OR HAVING LITTLE ENERGY: 0
SUM OF ALL RESPONSES TO PHQ QUESTIONS 1-9: 0
SUM OF ALL RESPONSES TO PHQ QUESTIONS 1-9: 0
5. POOR APPETITE OR OVEREATING: 0
2. FEELING DOWN, DEPRESSED OR HOPELESS: 0
8. MOVING OR SPEAKING SO SLOWLY THAT OTHER PEOPLE COULD HAVE NOTICED. OR THE OPPOSITE, BEING SO FIGETY OR RESTLESS THAT YOU HAVE BEEN MOVING AROUND A LOT MORE THAN USUAL: 0
SUM OF ALL RESPONSES TO PHQ QUESTIONS 1-9: 0
SUM OF ALL RESPONSES TO PHQ QUESTIONS 1-9: 0
SUM OF ALL RESPONSES TO PHQ9 QUESTIONS 1 & 2: 0
SUM OF ALL RESPONSES TO PHQ QUESTIONS 1-9: 0
SUM OF ALL RESPONSES TO PHQ QUESTIONS 1-9: 0
10. IF YOU CHECKED OFF ANY PROBLEMS, HOW DIFFICULT HAVE THESE PROBLEMS MADE IT FOR YOU TO DO YOUR WORK, TAKE CARE OF THINGS AT HOME, OR GET ALONG WITH OTHER PEOPLE: 0

## 2023-05-30 NOTE — PROGRESS NOTES
Sherita Camacho is a 40 y.o. female presenting for/with:    Chief Complaint   Patient presents with    Hypothyroidism    Cough     Pt reports having a cough for the past 2 months        Vitals:    05/30/23 0814   BP: 130/84   Site: Left Upper Arm   Position: Sitting   Cuff Size: Large Adult   Pulse: 97   Resp: 18   Temp: 99.6 °F (37.6 °C)   TempSrc: Temporal   SpO2: 99%   Weight: (!) 379 lb 6.4 oz (172.1 kg)   Height: 5' 7\" (1.702 m)       Pain Scale: 0 - No pain/10  Pain Location:     1. \"Have you been to the ER, urgent care clinic since your last visit? Hospitalized since your last visit? \"  [facility] 2-3 weeks ago    2. \"Have you seen or consulted any other health care providers outside of the [address] [place] [address] since your last visit? \"  no      3. For patients aged 39-70: Has the patient had a colonoscopy / FIT/ Cologuard? NA - based on age     If the patient is female:    4. For patients aged 41-77: Has the patient had a mammogram within the past 2 years? NA - based on age    11. For patients aged 21-65: Has the patient had a pap smear? Yes      PHQ-9  5/30/2023   Little interest or pleasure in doing things -   Little interest or pleasure in doing things -   Feeling down, depressed, or hopeless -   Trouble falling or staying asleep, or sleeping too much 0   Feeling tired or having little energy 0   Poor appetite or overeating 0   Feeling bad about yourself - or that you are a failure or have let yourself or your family down 0   Trouble concentrating on things, such as reading the newspaper or watching television 0   Moving or speaking so slowly that other people could have noticed.  Or the opposite - being so fidgety or restless that you have been moving around a lot more than usual 0   Thoughts that you would be better off dead, or of hurting yourself in some way 0   PHQ-2 Score -   Total Score PHQ 2 -   PHQ-9 Total Score 0   If you checked off any problems, how difficult have these problems
Sherita Mehta is a 40 y.o. female presenting for/with:    Chief Complaint   Patient presents with    Hypothyroidism    Cough     Pt reports having a cough for the past 2 months        Vitals:    05/30/23 0814   BP: 130/84   Site: Left Upper Arm   Position: Sitting   Cuff Size: Large Adult   Pulse: 97   Resp: 18   Temp: 99.6 °F (37.6 °C)   TempSrc: Temporal   SpO2: 99%   Weight: (!) 379 lb 6.4 oz (172.1 kg)   Height: 5' 7\" (1.702 m)       Pain Scale: 0 - No pain/10  Pain Location:     1. \"Have you been to the ER, urgent care clinic since your last visit? Hospitalized since your last visit? \" Yes [facility]gent [facility] 2-3 weeks ago    2. \"Have you seen or consulted any other health care providers outside of the [address] [place] [address] since your last visit? \" No     3. For patients aged 39-70: Has the patient had a colonoscopy / FIT/ Cologuard? NA - based on age     If the patient is female:    4. For patients aged 41-77: Has the patient had a mammogram within the past 2 years? NA - based on age    11. For patients aged 21-65: Has the patient had a pap smear? Yes - no Care Gap present      PHQ-9  5/30/2023   Little interest or pleasure in doing things -   Little interest or pleasure in doing things -   Feeling down, depressed, or hopeless -   Trouble falling or staying asleep, or sleeping too much 0   Feeling tired or having little energy 0   Poor appetite or overeating 0   Feeling bad about yourself - or that you are a failure or have let yourself or your family down 0   Trouble concentrating on things, such as reading the newspaper or watching television 0   Moving or speaking so slowly that other people could have noticed.  Or the opposite - being so fidgety or restless that you have been moving around a lot more than usual 0   Thoughts that you would be better off dead, or of hurting yourself in some way 0   PHQ-2 Score -   Total Score PHQ 2 -   PHQ-9 Total Score 0   If you checked off any problems, how
Didn't really improve with advair 115, and con't maint tx with singulair and claritin. Consider adding pepcid to cover GERD related provocation. Try longer course prednisone taper 60/40/20 3/3/3 and start back on Oadvair daily, 2pf BID x1mo.     Follow up 5/2023 for labs and sugar

## 2023-05-31 ENCOUNTER — TELEPHONE (OUTPATIENT)
Age: 38
End: 2023-05-31

## 2023-05-31 LAB
T4 FREE SERPL-MCNC: 1.6 NG/DL (ref 0.8–1.5)
TSH SERPL DL<=0.05 MIU/L-ACNC: 0.29 UIU/ML (ref 0.36–3.74)

## 2023-05-31 NOTE — RESULT ENCOUNTER NOTE
Thyroid level too strong. I need to cut your thyroid pill size. I have it listed at 175mcg daily, is that correct?

## 2023-05-31 NOTE — TELEPHONE ENCOUNTER
----- Message from Frank Jones sent at 2023  1:20 PM EDT -----  Regarding: LAB SPECIMEN PROBLEM  Contact: [phone]  Please see information below for details regarding a problem with samples received at [facility]. Patient Name: Hua Mcmillan    Patient : 1985    Tests Affected:  CBC    Description of Problem: Sample was clotted    Please place a new order and Client Services will contact the patient for recollection. If you have any questions please call [phone] and a representative will assist you.   Thank you,  Brayn [facility] Client Services

## 2023-06-06 ENCOUNTER — NURSE ONLY (OUTPATIENT)
Age: 38
End: 2023-06-06

## 2023-06-06 DIAGNOSIS — R73.02 IGT (IMPAIRED GLUCOSE TOLERANCE): Primary | ICD-10-CM

## 2023-06-06 DIAGNOSIS — E66.01 OBESITY, MORBID (HCC): ICD-10-CM

## 2023-09-22 RX ORDER — METFORMIN HYDROCHLORIDE 500 MG/1
TABLET, EXTENDED RELEASE ORAL
Qty: 360 TABLET | Refills: 1 | Status: SHIPPED | OUTPATIENT
Start: 2023-09-22

## 2023-10-06 ENCOUNTER — NURSE ONLY (OUTPATIENT)
Age: 38
End: 2023-10-06
Payer: COMMERCIAL

## 2023-10-06 DIAGNOSIS — Z23 NEEDS FLU SHOT: Primary | ICD-10-CM

## 2023-10-06 PROCEDURE — 90674 CCIIV4 VAC NO PRSV 0.5 ML IM: CPT | Performed by: FAMILY MEDICINE

## 2023-10-06 PROCEDURE — 90471 IMMUNIZATION ADMIN: CPT | Performed by: FAMILY MEDICINE

## 2023-10-06 NOTE — PROGRESS NOTES
After obtaining consent, and per orders of Dr. Camila Collier, injection of Flucelvax given in Right deltoid by [facility], [facility]. Patient instructed to remain in clinic for 20 minutes afterwards, and to report any adverse reaction to me immediately.

## 2023-11-07 ENCOUNTER — PATIENT MESSAGE (OUTPATIENT)
Age: 38
End: 2023-11-07

## 2023-11-07 DIAGNOSIS — E03.8 HYPOTHYROIDISM DUE TO HASHIMOTO'S THYROIDITIS: Primary | ICD-10-CM

## 2023-11-07 DIAGNOSIS — E06.3 HYPOTHYROIDISM DUE TO HASHIMOTO'S THYROIDITIS: Primary | ICD-10-CM

## 2023-11-08 NOTE — TELEPHONE ENCOUNTER
Ana East LPN 69/7/9051 4:85 PM EST      ----- Message -----  From: Francisco Samuels Sent: 11/7/2023 1:29 PM EST  To: *  Subject: [id number] Gustabo Guzman,    I have an endocrinologist back in [place] from when I used to live there and he's managing the Levothyroxine for my Hashimoto's. We've been thinking lately that I should probably switch to a Dr. down here who can manage it for me rather than doing everything virtually. Getting the lab work done has been difficult since there are no stand alone labs in the area and he's not in [facility]' network, so using [facility] for the lab work gets complicated as they want an in network  to attach the results to. I am in need of a refill within the next few days and he's written an order for TSH+free T4 lab work. Would you be able to take over? Thank you for your time and consideration.     Gisell Huang

## 2023-11-09 ENCOUNTER — TELEPHONE (OUTPATIENT)
Age: 38
End: 2023-11-09

## 2023-11-09 NOTE — TELEPHONE ENCOUNTER
----- Message from Paulina Richard sent at 11/9/2023  9:30 AM EST -----  Subject: Message to Provider    QUESTIONS  Information for Provider? Patient phoned wanting to schedule lab   appointment per PCP-no answer at the office- please call patient back to   schedule  ---------------------------------------------------------------------------  --------------  [address]  [phone]; OK to leave message on voicemail  ---------------------------------------------------------------------------  --------------  SCRIPT ANSWERS  Relationship to Patient?  Self

## 2023-11-10 ENCOUNTER — NURSE ONLY (OUTPATIENT)
Age: 38
End: 2023-11-10

## 2023-11-10 DIAGNOSIS — E03.8 HYPOTHYROIDISM DUE TO HASHIMOTO'S THYROIDITIS: ICD-10-CM

## 2023-11-10 DIAGNOSIS — E06.3 HYPOTHYROIDISM DUE TO HASHIMOTO'S THYROIDITIS: ICD-10-CM

## 2023-11-11 LAB
T4 FREE SERPL-MCNC: 1.3 NG/DL (ref 0.8–1.5)
TSH SERPL DL<=0.05 MIU/L-ACNC: 0.35 UIU/ML (ref 0.36–3.74)

## 2023-12-08 ENCOUNTER — OFFICE VISIT (OUTPATIENT)
Age: 38
End: 2023-12-08
Payer: COMMERCIAL

## 2023-12-08 VITALS — OXYGEN SATURATION: 97 % | TEMPERATURE: 99.3 F | HEART RATE: 94 BPM | RESPIRATION RATE: 18 BRPM

## 2023-12-08 DIAGNOSIS — J02.9 ACUTE PHARYNGITIS, UNSPECIFIED ETIOLOGY: ICD-10-CM

## 2023-12-08 DIAGNOSIS — R50.9 FEVER, UNSPECIFIED FEVER CAUSE: Primary | ICD-10-CM

## 2023-12-08 DIAGNOSIS — R05.9 COUGH, UNSPECIFIED TYPE: ICD-10-CM

## 2023-12-08 LAB
EXP DATE SOLUTION: NORMAL
EXP DATE SWAB: NORMAL
EXPIRATION DATE: NORMAL
INFLUENZA A ANTIGEN, POC: NEGATIVE
INFLUENZA B ANTIGEN, POC: NEGATIVE
LOT NUMBER POC: NORMAL
LOT NUMBER SOLUTION: NORMAL
LOT NUMBER SWAB: NORMAL
RSV RNA, POC: NEGATIVE
SARS-COV-2 RNA, POC: NEGATIVE
VALID INTERNAL CONTROL, POC: NORMAL
VALID INTERNAL CONTROL, POC: NORMAL

## 2023-12-08 PROCEDURE — 87634 RSV DNA/RNA AMP PROBE: CPT | Performed by: NURSE PRACTITIONER

## 2023-12-08 PROCEDURE — 87502 INFLUENZA DNA AMP PROBE: CPT | Performed by: NURSE PRACTITIONER

## 2023-12-08 PROCEDURE — 87635 SARS-COV-2 COVID-19 AMP PRB: CPT | Performed by: NURSE PRACTITIONER

## 2023-12-08 PROCEDURE — 99213 OFFICE O/P EST LOW 20 MIN: CPT | Performed by: NURSE PRACTITIONER

## 2023-12-08 RX ORDER — PREDNISONE 10 MG/1
TABLET ORAL
Qty: 18 TABLET | Refills: 0 | Status: SHIPPED | OUTPATIENT
Start: 2023-12-08 | End: 2023-12-16

## 2023-12-08 RX ORDER — AMOXICILLIN 500 MG/1
500 CAPSULE ORAL 2 TIMES DAILY
Qty: 20 CAPSULE | Refills: 0 | Status: SHIPPED | OUTPATIENT
Start: 2023-12-08 | End: 2023-12-18

## 2023-12-08 NOTE — PROGRESS NOTES
Advance Care Planning     The patient has appointed the following active healthcare agents:    Primary Decision Maker: Shyanne Aguero - [facility] - [phone]
1. Fever, unspecified fever cause  Negative Flu and COVID  - AMB POC INFLUENZA A  AND B REAL-TIME RT-PCR    2. Cough, unspecified type  Negative RSV  - AMB POC COVID-19 COV  - AMB POC RSV    3. Acute pharyngitis, unspecified etiology  Using Amoxicillin as she is not sure if she is pregnant  - amoxicillin (AMOXIL) 500 MG capsule; Take 1 capsule by mouth 2 times daily for 10 days  Dispense: 20 capsule; Refill: 0  - predniSONE (DELTASONE) 10 MG tablet; Take 3 tablets by mouth daily for 3 days, THEN 2 tablets daily for 3 days, THEN 1 tablet daily for 3 days. Dispense: 18 tablet;  Refill: 0     RTC BOOGIE Vasquez NP

## 2023-12-23 ENCOUNTER — APPOINTMENT (OUTPATIENT)
Facility: HOSPITAL | Age: 38
End: 2023-12-23
Payer: COMMERCIAL

## 2023-12-23 ENCOUNTER — HOSPITAL ENCOUNTER (EMERGENCY)
Facility: HOSPITAL | Age: 38
Discharge: HOME | End: 2023-12-23
Attending: FAMILY MEDICINE
Payer: COMMERCIAL

## 2023-12-23 VITALS
RESPIRATION RATE: 15 BRPM | HEART RATE: 110 BPM | DIASTOLIC BLOOD PRESSURE: 80 MMHG | OXYGEN SATURATION: 99 % | TEMPERATURE: 98.7 F | SYSTOLIC BLOOD PRESSURE: 126 MMHG | WEIGHT: 293 LBS | HEIGHT: 67 IN | BODY MASS INDEX: 45.99 KG/M2

## 2023-12-23 DIAGNOSIS — J02.0 STREP PHARYNGITIS: Primary | ICD-10-CM

## 2023-12-23 DIAGNOSIS — J06.9 VIRAL URI WITH COUGH: ICD-10-CM

## 2023-12-23 LAB
APPEARANCE UR: CLEAR
BACTERIA URNS QL MICRO: NEGATIVE /HPF
BILIRUB UR QL: NEGATIVE
COLOR UR: NORMAL
DEPRECATED S PYO AG THROAT QL EIA: POSITIVE
EPITH CASTS URNS QL MICRO: NORMAL /LPF
FLUAV RNA SPEC QL NAA+PROBE: NOT DETECTED
FLUBV RNA SPEC QL NAA+PROBE: NOT DETECTED
GLUCOSE UR STRIP.AUTO-MCNC: NEGATIVE MG/DL
HCG UR QL: NEGATIVE
HGB UR QL STRIP: NEGATIVE
KETONES UR QL STRIP.AUTO: NEGATIVE MG/DL
LEUKOCYTE ESTERASE UR QL STRIP.AUTO: NEGATIVE
NITRITE UR QL STRIP.AUTO: NEGATIVE
PH UR STRIP: 6.5 (ref 5–8)
PROT UR STRIP-MCNC: NEGATIVE MG/DL
RBC #/AREA URNS HPF: NORMAL /HPF (ref 0–5)
SARS-COV-2 RNA RESP QL NAA+PROBE: NOT DETECTED
SP GR UR REFRACTOMETRY: 1.01 (ref 1–1.03)
UROBILINOGEN UR QL STRIP.AUTO: 0.2 EU/DL (ref 0.2–1)
WBC URNS QL MICRO: NORMAL /HPF (ref 0–4)

## 2023-12-23 PROCEDURE — 81001 URINALYSIS AUTO W/SCOPE: CPT

## 2023-12-23 PROCEDURE — 87636 SARSCOV2 & INF A&B AMP PRB: CPT

## 2023-12-23 PROCEDURE — 71046 X-RAY EXAM CHEST 2 VIEWS: CPT

## 2023-12-23 PROCEDURE — 81025 URINE PREGNANCY TEST: CPT

## 2023-12-23 PROCEDURE — 99284 EMERGENCY DEPT VISIT MOD MDM: CPT

## 2023-12-23 PROCEDURE — 87880 STREP A ASSAY W/OPTIC: CPT

## 2023-12-23 PROCEDURE — 6370000000 HC RX 637 (ALT 250 FOR IP): Performed by: FAMILY MEDICINE

## 2023-12-23 PROCEDURE — 6370000000 HC RX 637 (ALT 250 FOR IP): Performed by: EMERGENCY MEDICINE

## 2023-12-23 RX ORDER — ACETAMINOPHEN 500 MG
1000 TABLET ORAL
Status: COMPLETED | OUTPATIENT
Start: 2023-12-23 | End: 2023-12-23

## 2023-12-23 RX ORDER — CEPHALEXIN 500 MG/1
500 CAPSULE ORAL 2 TIMES DAILY
Qty: 20 CAPSULE | Refills: 0 | Status: SHIPPED | OUTPATIENT
Start: 2023-12-23 | End: 2024-01-02

## 2023-12-23 RX ORDER — CEPHALEXIN 250 MG/1
500 CAPSULE ORAL
Status: COMPLETED | OUTPATIENT
Start: 2023-12-23 | End: 2023-12-23

## 2023-12-23 RX ORDER — CEPHALEXIN 500 MG/1
500 CAPSULE ORAL 2 TIMES DAILY
Qty: 20 CAPSULE | Refills: 0 | Status: SHIPPED | OUTPATIENT
Start: 2023-12-23 | End: 2023-12-23

## 2023-12-23 RX ORDER — IBUPROFEN 600 MG/1
600 TABLET ORAL
Status: COMPLETED | OUTPATIENT
Start: 2023-12-23 | End: 2023-12-23

## 2023-12-23 RX ADMIN — CEPHALEXIN 500 MG: 250 CAPSULE ORAL at 21:00

## 2023-12-23 RX ADMIN — CEPHALEXIN 500 MG: 250 CAPSULE ORAL at 20:59

## 2023-12-23 RX ADMIN — IBUPROFEN 600 MG: 600 TABLET, FILM COATED ORAL at 19:26

## 2023-12-23 RX ADMIN — ACETAMINOPHEN 1000 MG: 500 TABLET ORAL at 17:50

## 2023-12-24 NOTE — ED PROVIDER NOTES
[address] ENCOUNTER       Pt Name: Sarwat Mcdonald  MRN: [id number]  [address] [facility] 1985  Date of evaluation: 12/23/2023  Provider: Ruby Davis MD   PCP: Charity Mitchell MD  Note Started: 7:15 PM EST 12/23/23     CHIEF COMPLAINT       Chief Complaint   Patient presents with    URI        HISTORY OF PRESENT ILLNESS: 1 or more elements      History From: Patient and Patient's   Limitations in obtaining HPI include None     Sherita Mcdonald is a 45 y.o. female who presents with . Patient complains of sore throat, fever, sweats, chills, myalgias, arthralgias, mild occipital headache without stiffness, prominent rhinorrhea, and nonproductive cough starting abruptly today prior to arrival.  No dysuria urgency frequency. No rash. No earache. Her mother has had strep throat recently and she was exposed to her. There have been numerous people sick at her child's  center. Patient has a history of asthma but has not had to use her inhaler recently. No one else is ill at home. Nursing Notes were all reviewed and agreed with or any disagreements were addressed in the HPI. REVIEW OF SYSTEMS      Review of Systems     Positives and Pertinent negatives as per HPI.     PAST HISTORY     Past Medical History:  Past Medical History:   Diagnosis Date    Abnormal Papanicolaou smear of cervix     years ago, follow up normal    Alopecia     Anemia     Anxiety     Anxiety     Asthma     Behcet's disease ([address] [facility])     Depression     taking zoloft    Fibromyalgia     Fibromyalgia     GERD (gastroesophageal reflux disease)     Gestational diabetes     Gestational hypertension     Hashimoto's thyroiditis     History of Chiari malformation     decompression surgery with titanium mesh at base of skull    IBS (irritable bowel syndrome)     IBS (irritable bowel syndrome)     Insomnia     Liver disease     ultrasound normal    Sleep apnea     Sleep apnea     Vascular abnormality

## 2023-12-24 NOTE — DISCHARGE INSTRUCTIONS
Return if worse or for problems as noted above. Tylenol every 4 hours, Motrin every 6 hours as needed for fever or pain. Keflex twice a day for 10 days. Follow-up with MD if not better within the next 5 days.

## 2023-12-29 ENCOUNTER — TELEPHONE (OUTPATIENT)
Age: 38
End: 2023-12-29

## 2023-12-29 ENCOUNTER — OFFICE VISIT (OUTPATIENT)
Age: 38
End: 2023-12-29
Payer: COMMERCIAL

## 2023-12-29 ENCOUNTER — NURSE TRIAGE (OUTPATIENT)
Dept: OTHER | Facility: CLINIC | Age: 38
End: 2023-12-29

## 2023-12-29 VITALS — OXYGEN SATURATION: 97 % | RESPIRATION RATE: 18 BRPM | HEART RATE: 92 BPM | TEMPERATURE: 97.5 F

## 2023-12-29 DIAGNOSIS — B33.8 RSV (RESPIRATORY SYNCYTIAL VIRUS INFECTION): ICD-10-CM

## 2023-12-29 DIAGNOSIS — R05.9 COUGH, UNSPECIFIED TYPE: Primary | ICD-10-CM

## 2023-12-29 DIAGNOSIS — J45.31 MILD PERSISTENT ASTHMA WITH ACUTE EXACERBATION: ICD-10-CM

## 2023-12-29 LAB
EXP DATE SOLUTION: NORMAL
EXP DATE SWAB: NORMAL
EXPIRATION DATE: NORMAL
LOT NUMBER POC: NORMAL
LOT NUMBER SOLUTION: NORMAL
LOT NUMBER SWAB: NORMAL
RSV RNA, POC: POSITIVE
SARS-COV-2 RNA, POC: NEGATIVE
VALID INTERNAL CONTROL, POC: YES

## 2023-12-29 PROCEDURE — 87634 RSV DNA/RNA AMP PROBE: CPT | Performed by: NURSE PRACTITIONER

## 2023-12-29 PROCEDURE — 87635 SARS-COV-2 COVID-19 AMP PRB: CPT | Performed by: NURSE PRACTITIONER

## 2023-12-29 PROCEDURE — 99213 OFFICE O/P EST LOW 20 MIN: CPT | Performed by: NURSE PRACTITIONER

## 2023-12-29 RX ORDER — BENZONATATE 200 MG/1
200 CAPSULE ORAL 3 TIMES DAILY PRN
Qty: 30 CAPSULE | Refills: 0 | Status: SHIPPED | OUTPATIENT
Start: 2023-12-29 | End: 2024-01-05

## 2023-12-29 RX ORDER — ALBUTEROL SULFATE 90 UG/1
2 AEROSOL, METERED RESPIRATORY (INHALATION) 4 TIMES DAILY PRN
Qty: 18 G | Refills: 5 | Status: SHIPPED | OUTPATIENT
Start: 2023-12-29

## 2023-12-29 RX ORDER — PREDNISONE 10 MG/1
TABLET ORAL
Qty: 18 TABLET | Refills: 0 | Status: SHIPPED | OUTPATIENT
Start: 2023-12-29 | End: 2024-01-06

## 2023-12-29 NOTE — TELEPHONE ENCOUNTER
[facility]    Received call from [facility] at [facility] with [employer] Subjective: Caller states \"Was in ER 5 days ago for fever 104.3. Diagnosed with URI, strep\"     Current Symptoms: Sinus congestion  Cough - Not productive, constant, makes light headed and SOB    Onset: 7 days ago    Pain Severity: Has throat pain just from coughing    Temperature: Denies     What has been tried: Antibiotics with about 3 days left, Mucinex DM    History related to the current reason for call: Son has RSV, prone to getting bronchitis    LMP: NA Pregnant: Unknown    Recommended disposition: Go to [facility]    Care advice provided, patient verbalizes understanding; denies any other questions or concerns; instructed to call back for any new or worsening symptoms. Patient/Caller agrees with recommended disposition; writer provided warm transfer to Nadira Mccormack at [facility] for appointment scheduling     Attention Provider: Thank you for allowing me to participate in the care of your patient. The patient was connected to triage in response to information provided to the ECC/PSC. Please do not respond through this encounter as the response is not directed to a shared pool.     Reason for Disposition   MILD difficulty breathing (e.g., minimal/no SOB at rest, SOB with walking, pulse <100) and still present when not coughing    Protocols used: Cough-ADULT-OH

## 2023-12-29 NOTE — TELEPHONE ENCOUNTER
Pt wants to be seen today, pt c/o of cough, please advise call back # [phone]. Pt was in the ER on the 12/23.

## 2023-12-29 NOTE — PROGRESS NOTES
Chief Complaint   Patient presents with    Cough     X 1 week. Fever 104. Seen at ER Saturday. Covid/flu negative. Strep positive. Chest x-ray clear. Cough worse now. Son positive RSV/strep. HPI:       is a 45 y.o. female. New Issues:  She is here for an acute visit. She was seen in the [facility] ER and diagnosed with Strep and an URI. Clear CXR. Given Keflex. Her cough is getting worse. She reports she has asthma. Taking Mucinex DM. Her 3year old son was diagnosed with RSV. No Known Allergies    Current Outpatient Medications   Medication Sig Dispense Refill    cephALEXin (KEFLEX) 500 MG capsule Take 1 capsule by mouth 2 times daily for 10 days 20 capsule 0    metFORMIN (GLUCOPHAGE-XR) 500 MG extended release tablet Take 4 tablets by mouth daily at dinner. Indications: prevention of diabetes and insulin resistance.  360 tablet 1    FIBER PO fiber      Calcium Polycarbophil (FIBER) 625 MG TABS Take 1 tablet by mouth daily      dexlansoprazole (DEXILANT) 60 MG CPDR delayed release capsule dexlansoprazole 60 mg capsule,biphase delayed release      fluticasone-salmeterol (ADVAIR HFA) 115-21 MCG/ACT inhaler Inhale 2 puffs into the lungs 2 times daily 1 each 3    albuterol sulfate HFA (VENTOLIN HFA) 108 (90 Base) MCG/ACT inhaler Inhale 2 puffs into the lungs 4 times daily as needed for Wheezing 18 g 5    Prenatal Vit-Fe Fumarate-FA (PRENATAL VITAMIN) 27-0.8 MG TABS Take 1 tablet by mouth daily 100 tablet 3    albuterol sulfate HFA (PROVENTIL;VENTOLIN;PROAIR) 108 (90 Base) MCG/ACT inhaler Inhale 2 puffs into the lungs every 4 hours as needed      ferrous sulfate (SLOW FE) 142 (45 Fe) MG extended release tablet Take 142 mg by mouth every morning (before breakfast)      levothyroxine (SYNTHROID) 200 MCG tablet Take 1 tablet by mouth every morning (before breakfast)      loratadine (CLARITIN) 10 MG capsule Take 1 capsule by mouth daily      montelukast (SINGULAIR) 10 MG tablet Take 1 tablet by

## 2024-01-01 ENCOUNTER — PATIENT MESSAGE (OUTPATIENT)
Age: 39
End: 2024-01-01

## 2024-01-02 NOTE — TELEPHONE ENCOUNTER
Dian, April, MA 1/2/2024 7:18 AM CHONG    Swapna saw her on Friday and she tested positive for RSV.. are you okay with ordering nebulizer and if so I can do this through [facility].  ----- Message -----  From: Sherita Santizo  Sent: 1/1/2024 11:43 AM EST  To: *  Subject: Nebulizer     Happy New Year,    You diagnosed me w RSV on Friday and the ER diagnosed me with strep, an upper respiratory infection and probable flu the Saturday before. I'm still taking the antibiotics, pearls, steroids, mucinex dm and my inhaler but I can't seem to get anything up and this cough is kicking my butt. My ribs and stomach are so sore and my voice is shot. I'm alternating between Tylenol and Motrin for the pain but trying not to take it often. Even if this has turned into pneumonia or something at this point, I don't know what else there is to do but I was hoping you could prescribe a nebulizer and the medication for it. It's helped in the past. I'm coughing to the point of getting light headed and sleeping is difficult. I use a cpap w a humidifier but the forced air seems to make things worse at times. I've tried hot showers, heating blankets on my chest and back, vaporub a cuadra cough thing and percussion. I ordered an Oscillating Positive Expiratory Pressure inhaler (non medicated) which helped   when I had bronchitis and pneumonia a few years ago (which I lost somewhere) but it won't be here until Friday. Sorry for the stream of consciousness. What are your thoughts on the nebulizer? Oh I also should be getting a humidifier today. Thanks!

## 2024-02-19 RX ORDER — MONTELUKAST SODIUM 10 MG/1
10 TABLET ORAL DAILY
Qty: 90 TABLET | Refills: 2 | OUTPATIENT
Start: 2024-02-19

## 2024-02-20 RX ORDER — MONTELUKAST SODIUM 10 MG/1
10 TABLET ORAL DAILY
Qty: 90 TABLET | Refills: 3 | Status: SHIPPED | OUTPATIENT
Start: 2024-02-20

## 2024-03-19 RX ORDER — METFORMIN HYDROCHLORIDE 500 MG/1
1000 TABLET, EXTENDED RELEASE ORAL 2 TIMES DAILY
Qty: 360 TABLET | Refills: 0 | Status: SHIPPED | OUTPATIENT
Start: 2024-03-19

## 2024-06-03 ENCOUNTER — HOSPITAL ENCOUNTER (EMERGENCY)
Facility: HOSPITAL | Age: 39
Discharge: HOME | End: 2024-06-03
Attending: EMERGENCY MEDICINE
Payer: COMMERCIAL

## 2024-06-03 ENCOUNTER — APPOINTMENT (OUTPATIENT)
Facility: HOSPITAL | Age: 39
End: 2024-06-03
Payer: COMMERCIAL

## 2024-06-03 VITALS
SYSTOLIC BLOOD PRESSURE: 124 MMHG | OXYGEN SATURATION: 95 % | DIASTOLIC BLOOD PRESSURE: 71 MMHG | HEART RATE: 93 BPM | RESPIRATION RATE: 16 BRPM | TEMPERATURE: 99.6 F

## 2024-06-03 DIAGNOSIS — O20.0 THREATENED MISCARRIAGE IN EARLY PREGNANCY: Primary | ICD-10-CM

## 2024-06-03 DIAGNOSIS — O20.8 SUBCHORIONIC HEMORRHAGE IN FIRST TRIMESTER: ICD-10-CM

## 2024-06-03 LAB
BASOPHILS # BLD: 0 K/UL (ref 0–0.1)
BASOPHILS NFR BLD: 1 % (ref 0–1)
CRL: 0.73 CM
CRL: 0.73 CM
DIFFERENTIAL METHOD BLD: NORMAL
EOSINOPHIL # BLD: 0.2 K/UL (ref 0–0.4)
EOSINOPHIL NFR BLD: 3 % (ref 0–7)
ERYTHROCYTE [DISTWIDTH] IN BLOOD BY AUTOMATED COUNT: 13.2 % (ref 11.5–14.5)
HCG SERPL-ACNC: ABNORMAL MIU/ML (ref 0–6)
HCT VFR BLD AUTO: 37.2 % (ref 35–47)
HGB BLD-MCNC: 12.8 G/DL (ref 11.5–16)
IMM GRANULOCYTES # BLD AUTO: 0 K/UL (ref 0–0.04)
IMM GRANULOCYTES NFR BLD AUTO: 0 % (ref 0–0.5)
LYMPHOCYTES # BLD: 2.8 K/UL (ref 0.8–3.5)
LYMPHOCYTES NFR BLD: 37 % (ref 12–49)
MCH RBC QN AUTO: 30.7 PG (ref 26–34)
MCHC RBC AUTO-ENTMCNC: 34.4 G/DL (ref 30–36.5)
MCV RBC AUTO: 89.2 FL (ref 80–99)
MONOCYTES # BLD: 0.6 K/UL (ref 0–1)
MONOCYTES NFR BLD: 7 % (ref 5–13)
NEUTS SEG # BLD: 4 K/UL (ref 1.8–8)
NEUTS SEG NFR BLD: 52 % (ref 32–75)
NRBC # BLD: 0 K/UL (ref 0–0.01)
NRBC BLD-RTO: 0 PER 100 WBC
PLATELET # BLD AUTO: 262 K/UL (ref 150–400)
PMV BLD AUTO: 9.6 FL (ref 8.9–12.9)
RBC # BLD AUTO: 4.17 M/UL (ref 3.8–5.2)
SAC DIAMETER: 1.44 CM
SAC DIAMETER: 1.44 CM
WBC # BLD AUTO: 7.7 K/UL (ref 3.6–11)

## 2024-06-03 PROCEDURE — 96374 THER/PROPH/DIAG INJ IV PUSH: CPT

## 2024-06-03 PROCEDURE — 76801 OB US < 14 WKS SINGLE FETUS: CPT

## 2024-06-03 PROCEDURE — 85025 COMPLETE CBC W/AUTO DIFF WBC: CPT

## 2024-06-03 PROCEDURE — 84702 CHORIONIC GONADOTROPIN TEST: CPT

## 2024-06-03 PROCEDURE — 6360000002 HC RX W HCPCS: Performed by: EMERGENCY MEDICINE

## 2024-06-03 PROCEDURE — 36415 COLL VENOUS BLD VENIPUNCTURE: CPT

## 2024-06-03 PROCEDURE — 99284 EMERGENCY DEPT VISIT MOD MDM: CPT

## 2024-06-03 RX ORDER — OXYCODONE HYDROCHLORIDE 5 MG/1
5 TABLET ORAL EVERY 8 HOURS PRN
Qty: 6 TABLET | Refills: 0 | Status: SHIPPED | OUTPATIENT
Start: 2024-06-03 | End: 2024-06-06

## 2024-06-03 RX ORDER — MORPHINE SULFATE 4 MG/ML
4 INJECTION, SOLUTION INTRAMUSCULAR; INTRAVENOUS
Status: COMPLETED | OUTPATIENT
Start: 2024-06-03 | End: 2024-06-03

## 2024-06-03 RX ORDER — OXYCODONE HYDROCHLORIDE 5 MG/1
5 TABLET ORAL EVERY 8 HOURS PRN
Qty: 6 TABLET | Refills: 0 | Status: SHIPPED | OUTPATIENT
Start: 2024-06-03 | End: 2024-06-03

## 2024-06-03 RX ORDER — ONDANSETRON 2 MG/ML
4 INJECTION INTRAMUSCULAR; INTRAVENOUS
Status: DISCONTINUED | OUTPATIENT
Start: 2024-06-03 | End: 2024-06-04 | Stop reason: HOSPADM

## 2024-06-03 RX ADMIN — MORPHINE SULFATE 4 MG: 4 INJECTION, SOLUTION INTRAMUSCULAR; INTRAVENOUS at 19:33

## 2024-06-03 ASSESSMENT — LIFESTYLE VARIABLES
HOW OFTEN DO YOU HAVE A DRINK CONTAINING ALCOHOL: NEVER
HOW MANY STANDARD DRINKS CONTAINING ALCOHOL DO YOU HAVE ON A TYPICAL DAY: PATIENT DOES NOT DRINK

## 2024-06-03 ASSESSMENT — PAIN SCALES - GENERAL: PAINLEVEL_OUTOF10: 10

## 2024-06-03 ASSESSMENT — PAIN - FUNCTIONAL ASSESSMENT: PAIN_FUNCTIONAL_ASSESSMENT: 0-10

## 2024-06-03 ASSESSMENT — PAIN DESCRIPTION - LOCATION: LOCATION: ABDOMEN

## 2024-06-03 ASSESSMENT — PAIN DESCRIPTION - ORIENTATION: ORIENTATION: RIGHT;LEFT;LOWER

## 2024-06-03 ASSESSMENT — PAIN DESCRIPTION - DESCRIPTORS: DESCRIPTORS: CRAMPING

## 2024-06-03 NOTE — ED TRIAGE NOTES
6 weeks gestation. Today had normal US. at approx 1630 pt reports sudden heavy bleeding with clots, cramping.

## 2024-06-04 NOTE — DISCHARGE INSTRUCTIONS
Return if your bleeding is heavy and you soaked 3 pads in 1 hour or 1 pad per hour for 3 hours or more in a row.

## 2024-06-04 NOTE — ED PROVIDER NOTES
Wheezing, Disp-18 g, R-5Normal      !! FIBER PO fiberHistorical Med      !! Calcium Polycarbophil (FIBER) 625 MG TABS Take 1 tablet by mouth dailyHistorical Med      dexlansoprazole (DEXILANT) 60 MG CPDR delayed release capsule dexlansoprazole 60 mg capsule,biphase delayed releaseHistorical Med      fluticasone-salmeterol (ADVAIR HFA) 115-21 MCG/ACT inhaler Inhale 2 puffs into the lungs 2 times daily, Disp-1 each, R-3NO PRINT      Prenatal Vit-Fe Fumarate-FA (PRENATAL VITAMIN) 27-0.8 MG TABS Take 1 tablet by mouth daily, Disp-100 tablet, R-3One a day prenatal advanced type pleaseNormal      !! albuterol sulfate HFA (PROVENTIL;VENTOLIN;PROAIR) 108 (90 Base) MCG/ACT inhaler Inhale 2 puffs into the lungs every 4 hours as neededHistorical Med      ferrous sulfate (SLOW FE) 142 (45 Fe) MG extended release tablet Take 142 mg by mouth every morning (before breakfast)Historical Med      levothyroxine (SYNTHROID) 200 MCG tablet Take 1 tablet by mouth every morning (before breakfast)Historical Med      loratadine (CLARITIN) 10 MG capsule Take 1 capsule by mouth dailyHistorical Med       !! - Potential duplicate medications found. Please discuss with provider.          DISCONTINUED MEDICATIONS:  Discharge Medication List as of 6/3/2024  9:49 PM          PATIENT REFERRED TO:  Follow Up with:  your ob/gyn    Call in 1 day      [facility] EMERGENCY DEP  [address] [address] [phone]    If symptoms worsen      Return to ED if worse     Diagnosis     Clinical Impression:   1. Threatened miscarriage in early pregnancy    2. Subchorionic hemorrhage in first trimester          IJose MD am the first provider for this patient and am the attending of record for this patient encounter.    Jose Slater MD        Please note that this dictation was completed with Dragon, computer voice recognition software.  Quite often unanticipated grammatical, syntax, homophones, and other interpretive errors are

## 2024-08-26 RX ORDER — LEVOTHYROXINE SODIUM 200 UG/1
200 TABLET ORAL
Qty: 90 TABLET | Refills: 3 | Status: SHIPPED | OUTPATIENT
Start: 2024-08-26

## 2024-09-07 ENCOUNTER — HOSPITAL ENCOUNTER (EMERGENCY)
Facility: HOSPITAL | Age: 39
Discharge: ANOTHER HOSPITAL | End: 2024-09-07
Attending: FAMILY MEDICINE | Admitting: FAMILY MEDICINE
Payer: COMMERCIAL

## 2024-09-07 VITALS
HEART RATE: 91 BPM | TEMPERATURE: 98.5 F | WEIGHT: 293 LBS | DIASTOLIC BLOOD PRESSURE: 71 MMHG | SYSTOLIC BLOOD PRESSURE: 134 MMHG | BODY MASS INDEX: 45.99 KG/M2 | HEIGHT: 67 IN | RESPIRATION RATE: 16 BRPM | OXYGEN SATURATION: 98 %

## 2024-09-07 DIAGNOSIS — R10.33 PERIUMBILICAL ABDOMINAL PAIN: Primary | ICD-10-CM

## 2024-09-07 LAB
ALBUMIN SERPL-MCNC: 3.1 G/DL (ref 3.5–5)
ALBUMIN/GLOB SERPL: 0.7 (ref 1.1–2.2)
ALP SERPL-CCNC: 55 U/L (ref 45–117)
ALT SERPL-CCNC: 18 U/L (ref 12–78)
ANION GAP SERPL CALC-SCNC: 13 MMOL/L (ref 2–12)
APPEARANCE UR: CLEAR
AST SERPL-CCNC: 25 U/L (ref 15–37)
BACTERIA URNS QL MICRO: ABNORMAL /HPF
BASOPHILS # BLD: 0 K/UL (ref 0–0.1)
BASOPHILS NFR BLD: 0 % (ref 0–1)
BILIRUB SERPL-MCNC: 0.2 MG/DL (ref 0.2–1)
BILIRUB UR QL: NEGATIVE
BUN SERPL-MCNC: 10 MG/DL (ref 6–20)
BUN/CREAT SERPL: 14 (ref 12–20)
CALCIUM SERPL-MCNC: 10.1 MG/DL (ref 8.5–10.1)
CHLORIDE SERPL-SCNC: 100 MMOL/L (ref 97–108)
CO2 SERPL-SCNC: 24 MMOL/L (ref 21–32)
COLOR UR: ABNORMAL
CREAT SERPL-MCNC: 0.69 MG/DL (ref 0.55–1.02)
DIFFERENTIAL METHOD BLD: ABNORMAL
EOSINOPHIL # BLD: 0.3 K/UL (ref 0–0.4)
EOSINOPHIL NFR BLD: 3 % (ref 0–7)
EPITH CASTS URNS QL MICRO: ABNORMAL /LPF
ERYTHROCYTE [DISTWIDTH] IN BLOOD BY AUTOMATED COUNT: 14.5 % (ref 11.5–14.5)
GLOBULIN SER CALC-MCNC: 4.5 G/DL (ref 2–4)
GLUCOSE SERPL-MCNC: 99 MG/DL (ref 65–100)
GLUCOSE UR STRIP.AUTO-MCNC: NEGATIVE MG/DL
HCT VFR BLD AUTO: 31.5 % (ref 35–47)
HGB BLD-MCNC: 11.1 G/DL (ref 11.5–16)
HGB UR QL STRIP: NEGATIVE
HYALINE CASTS URNS QL MICRO: ABNORMAL /LPF (ref 0–5)
IMM GRANULOCYTES # BLD AUTO: 0 K/UL (ref 0–0.04)
IMM GRANULOCYTES NFR BLD AUTO: 0 % (ref 0–0.5)
KETONES UR QL STRIP.AUTO: NEGATIVE MG/DL
LEUKOCYTE ESTERASE UR QL STRIP.AUTO: NEGATIVE
LYMPHOCYTES # BLD: 2.4 K/UL (ref 0.8–3.5)
LYMPHOCYTES NFR BLD: 27 % (ref 12–49)
MCH RBC QN AUTO: 31.6 PG (ref 26–34)
MCHC RBC AUTO-ENTMCNC: 35.2 G/DL (ref 30–36.5)
MCV RBC AUTO: 89.7 FL (ref 80–99)
MONOCYTES # BLD: 0.5 K/UL (ref 0–1)
MONOCYTES NFR BLD: 6 % (ref 5–13)
MUCOUS THREADS URNS QL MICRO: ABNORMAL /LPF
NEUTS SEG # BLD: 5.6 K/UL (ref 1.8–8)
NEUTS SEG NFR BLD: 64 % (ref 32–75)
NITRITE UR QL STRIP.AUTO: NEGATIVE
NRBC # BLD: 0 K/UL (ref 0–0.01)
NRBC BLD-RTO: 0 PER 100 WBC
PH UR STRIP: 6 (ref 5–8)
PLATELET # BLD AUTO: 256 K/UL (ref 150–400)
PMV BLD AUTO: 10.2 FL (ref 8.9–12.9)
POTASSIUM SERPL-SCNC: 4 MMOL/L (ref 3.5–5.1)
PROT SERPL-MCNC: 7.6 G/DL (ref 6.4–8.2)
PROT UR STRIP-MCNC: NEGATIVE MG/DL
RBC # BLD AUTO: 3.51 M/UL (ref 3.8–5.2)
RBC #/AREA URNS HPF: ABNORMAL /HPF (ref 0–5)
SODIUM SERPL-SCNC: 137 MMOL/L (ref 136–145)
SP GR UR REFRACTOMETRY: 1.02 (ref 1–1.03)
URINE CULTURE IF INDICATED: ABNORMAL
UROBILINOGEN UR QL STRIP.AUTO: 0.2 EU/DL (ref 0.2–1)
WBC # BLD AUTO: 8.9 K/UL (ref 3.6–11)
WBC URNS QL MICRO: ABNORMAL /HPF (ref 0–4)

## 2024-09-07 PROCEDURE — 99285 EMERGENCY DEPT VISIT HI MDM: CPT

## 2024-09-07 PROCEDURE — 80053 COMPREHEN METABOLIC PANEL: CPT

## 2024-09-07 PROCEDURE — 36415 COLL VENOUS BLD VENIPUNCTURE: CPT

## 2024-09-07 PROCEDURE — 96374 THER/PROPH/DIAG INJ IV PUSH: CPT

## 2024-09-07 PROCEDURE — 2580000003 HC RX 258: Performed by: FAMILY MEDICINE

## 2024-09-07 PROCEDURE — 81001 URINALYSIS AUTO W/SCOPE: CPT

## 2024-09-07 PROCEDURE — 85025 COMPLETE CBC W/AUTO DIFF WBC: CPT

## 2024-09-07 PROCEDURE — 6360000002 HC RX W HCPCS: Performed by: FAMILY MEDICINE

## 2024-09-07 PROCEDURE — 96375 TX/PRO/DX INJ NEW DRUG ADDON: CPT

## 2024-09-07 RX ORDER — 0.9 % SODIUM CHLORIDE 0.9 %
1000 INTRAVENOUS SOLUTION INTRAVENOUS ONCE
Status: COMPLETED | OUTPATIENT
Start: 2024-09-07 | End: 2024-09-07

## 2024-09-07 RX ORDER — MORPHINE SULFATE 4 MG/ML
4 INJECTION, SOLUTION INTRAMUSCULAR; INTRAVENOUS
Status: COMPLETED | OUTPATIENT
Start: 2024-09-07 | End: 2024-09-07

## 2024-09-07 RX ORDER — ONDANSETRON 2 MG/ML
4 INJECTION INTRAMUSCULAR; INTRAVENOUS ONCE
Status: COMPLETED | OUTPATIENT
Start: 2024-09-07 | End: 2024-09-07

## 2024-09-07 RX ADMIN — ONDANSETRON 4 MG: 2 INJECTION INTRAMUSCULAR; INTRAVENOUS at 02:44

## 2024-09-07 RX ADMIN — MORPHINE SULFATE 4 MG: 4 INJECTION INTRAVENOUS at 02:45

## 2024-09-07 RX ADMIN — SODIUM CHLORIDE 1000 ML: 9 INJECTION, SOLUTION INTRAVENOUS at 02:43

## 2024-09-07 ASSESSMENT — PAIN SCALES - GENERAL
PAINLEVEL_OUTOF10: 0

## 2024-09-07 ASSESSMENT — PAIN - FUNCTIONAL ASSESSMENT: PAIN_FUNCTIONAL_ASSESSMENT: NONE - DENIES PAIN

## 2024-09-07 NOTE — ED PROVIDER NOTES
[facility] EMERGENCY DEP  EMERGENCY DEPARTMENT ENCOUNTER       Pt Name: Sherita Santizo  MRN: [id number]  Birthdate 1985  Date of evaluation: 2024  Provider: Starr Olivas MD   PCP: Arcadio Venegas MD  Note Started: 1:59 AM EDT 24     CHIEF COMPLAINT       Chief Complaint   Patient presents with    Abdominal Pain        HISTORY OF PRESENT ILLNESS: 1 or more elements      History From: Patient  HPI Limitations: None     Sherita Santizo is a 38 y.o. female  at 20 weeks EGA who presents to the ED because of periumbilical pain that started yesterday morning. It has worsened progressively throughout the day and into the early hours of this morning. She has had no problems with the pregnancy, and with this pain she has had no vaginal bleeding or contractions. She is followed for this pregnancy at [facility], she reports because of her age and previous .     Nursing Notes were all reviewed and agreed with or any disagreements were addressed in the HPI.     REVIEW OF SYSTEMS      Review of Systems     Positives and Pertinent negatives as per HPI.    PAST HISTORY     Past Medical History:  Past Medical History:   Diagnosis Date    Abnormal Papanicolaou smear of cervix     years ago, follow up normal    Alopecia     Anemia     Anxiety     Anxiety     Asthma     Behcet's disease (HCC)     Depression     taking zoloft    Fibromyalgia     Fibromyalgia     GERD (gastroesophageal reflux disease)     Gestational diabetes     Gestational hypertension     Hashimoto's thyroiditis     History of Chiari malformation     decompression surgery with titanium mesh at base of skull    IBS (irritable bowel syndrome)     IBS (irritable bowel syndrome)     Insomnia     Liver disease     ultrasound normal    Sleep apnea     Sleep apnea     Vascular abnormality     Bechet's disease    Vitamin D deficiency          Past Surgical History:  Past Surgical History:   Procedure Laterality Date    OTHER SURGICAL HISTORY      exploratory  lb)   Height:  1.702 m (5' 7\")            Patient was given the following medications:  Medications   morphine sulfate (PF) injection 4 mg (4 mg IntraVENous Given 9/7/24 0245)   ondansetron (ZOFRAN) injection 4 mg (4 mg IntraVENous Given 9/7/24 0244)   sodium chloride 0.9 % bolus 1,000 mL (1,000 mLs IntraVENous New Bag 9/7/24 0243)       CONSULTS: (Who and What was discussed)  [facility] ED      CLINICAL MANAGEMENT TOOLS:    NA    Chronic Conditions: Umbilical hernia, Behcet's Dz, Fatty liver, Anxiety    Social Determinants affecting Dx or Tx: None    Records Reviewed (source and summary of external notes): Nursing Notes, Old Medical Records, Previous Radiology Studies, and Previous Laboratory Studies    CC/HPI Summary, DDx, ED Course, and Reassessment:     Differential Dx       Umbilical hernia or abd wall problem       Acute appendicitis       Acute gastritis            Shortly after her arrival to the ED, the patient was examined and labs sent. She was given 4 mg morphine, 4 mg ondansetron and 1000 ml NS IV. Her feet were elevated in a modified Trendelenburg position. [facility] was called and the ED physician accepted in transfer. Patient and  comfortable with POV transport.         Disposition Considerations (Tests not done, Shared Decision Making, Pt Expectation of Test or Tx.): none     FINAL IMPRESSION     1. Periumbilical abdominal pain          DISPOSITION/PLAN   DISPOSITION Decision To Transfer 09/07/2024 02:52:27 AM  Condition at Disposition: Stable      Transfer: The patient is being transferred to [facility] ED. The results of their tests and reasons for their transfer have been discussed with the patient and/or available family. The patient/family has conveyed agreement and understanding for the need to be transferred and for their diagnosis. Consultation has been made with Dr Jose R Hillman, who agrees to accept the transfer.      PATIENT REFERRED TO:  No follow-up provider specified.       DISCHARGE MEDICATIONS:

## 2024-11-01 ENCOUNTER — OFFICE VISIT (OUTPATIENT)
Age: 39
End: 2024-11-01
Payer: COMMERCIAL

## 2024-11-01 VITALS — RESPIRATION RATE: 18 BRPM | TEMPERATURE: 98.7 F | OXYGEN SATURATION: 94 % | HEART RATE: 97 BPM

## 2024-11-01 DIAGNOSIS — J02.9 SORE THROAT: ICD-10-CM

## 2024-11-01 DIAGNOSIS — Z3A.28 28 WEEKS GESTATION OF PREGNANCY: ICD-10-CM

## 2024-11-01 DIAGNOSIS — J45.31 MILD PERSISTENT ASTHMA WITH ACUTE EXACERBATION: Primary | ICD-10-CM

## 2024-11-01 DIAGNOSIS — R05.9 COUGH, UNSPECIFIED TYPE: ICD-10-CM

## 2024-11-01 LAB
EXP DATE SOLUTION: NORMAL
EXP DATE SWAB: NORMAL
EXPIRATION DATE: NORMAL
LOT NUMBER POC: NORMAL
LOT NUMBER SOLUTION: NORMAL
LOT NUMBER SWAB: NORMAL
SARS-COV-2 RNA, POC: NEGATIVE
STREP PYOGENES DNA, POC: NEGATIVE
VALID INTERNAL CONTROL, POC: YES

## 2024-11-01 PROCEDURE — 99213 OFFICE O/P EST LOW 20 MIN: CPT | Performed by: FAMILY MEDICINE

## 2024-11-01 PROCEDURE — 87651 STREP A DNA AMP PROBE: CPT | Performed by: FAMILY MEDICINE

## 2024-11-01 PROCEDURE — 87635 SARS-COV-2 COVID-19 AMP PRB: CPT | Performed by: FAMILY MEDICINE

## 2024-11-01 RX ORDER — PREDNISONE 20 MG/1
TABLET ORAL
Qty: 11 TABLET | Refills: 0 | Status: SHIPPED | OUTPATIENT
Start: 2024-11-01 | End: 2024-11-09

## 2024-11-01 RX ORDER — FLUTICASONE PROPIONATE AND SALMETEROL XINAFOATE 115; 21 UG/1; UG/1
2 AEROSOL, METERED RESPIRATORY (INHALATION) 2 TIMES DAILY
Qty: 1 EACH | Refills: 3 | Status: SHIPPED | OUTPATIENT
Start: 2024-11-01

## 2024-11-01 SDOH — ECONOMIC STABILITY: FOOD INSECURITY: WITHIN THE PAST 12 MONTHS, THE FOOD YOU BOUGHT JUST DIDN'T LAST AND YOU DIDN'T HAVE MONEY TO GET MORE.: NEVER TRUE

## 2024-11-01 SDOH — ECONOMIC STABILITY: FOOD INSECURITY: WITHIN THE PAST 12 MONTHS, YOU WORRIED THAT YOUR FOOD WOULD RUN OUT BEFORE YOU GOT MONEY TO BUY MORE.: NEVER TRUE

## 2024-11-01 SDOH — ECONOMIC STABILITY: INCOME INSECURITY: HOW HARD IS IT FOR YOU TO PAY FOR THE VERY BASICS LIKE FOOD, HOUSING, MEDICAL CARE, AND HEATING?: NOT HARD AT ALL

## 2024-11-01 ASSESSMENT — PATIENT HEALTH QUESTIONNAIRE - PHQ9
9. THOUGHTS THAT YOU WOULD BE BETTER OFF DEAD, OR OF HURTING YOURSELF: NOT AT ALL
7. TROUBLE CONCENTRATING ON THINGS, SUCH AS READING THE NEWSPAPER OR WATCHING TELEVISION: NOT AT ALL
1. LITTLE INTEREST OR PLEASURE IN DOING THINGS: NOT AT ALL
4. FEELING TIRED OR HAVING LITTLE ENERGY: NOT AT ALL
2. FEELING DOWN, DEPRESSED OR HOPELESS: NOT AT ALL
6. FEELING BAD ABOUT YOURSELF - OR THAT YOU ARE A FAILURE OR HAVE LET YOURSELF OR YOUR FAMILY DOWN: NOT AT ALL
SUM OF ALL RESPONSES TO PHQ QUESTIONS 1-9: 0
SUM OF ALL RESPONSES TO PHQ QUESTIONS 1-9: 0
10. IF YOU CHECKED OFF ANY PROBLEMS, HOW DIFFICULT HAVE THESE PROBLEMS MADE IT FOR YOU TO DO YOUR WORK, TAKE CARE OF THINGS AT HOME, OR GET ALONG WITH OTHER PEOPLE: NOT DIFFICULT AT ALL
SUM OF ALL RESPONSES TO PHQ QUESTIONS 1-9: 0
5. POOR APPETITE OR OVEREATING: NOT AT ALL
3. TROUBLE FALLING OR STAYING ASLEEP: NOT AT ALL
8. MOVING OR SPEAKING SO SLOWLY THAT OTHER PEOPLE COULD HAVE NOTICED. OR THE OPPOSITE, BEING SO FIGETY OR RESTLESS THAT YOU HAVE BEEN MOVING AROUND A LOT MORE THAN USUAL: NOT AT ALL
SUM OF ALL RESPONSES TO PHQ QUESTIONS 1-9: 0
SUM OF ALL RESPONSES TO PHQ9 QUESTIONS 1 & 2: 0

## 2024-11-01 NOTE — PROGRESS NOTES
Results for orders placed or performed in visit on 11/01/24   AMB POC COVID-19 COV   Result Value Ref Range    SARS-COV-2 RNA, POC Negative     Lot number swab      EXP date swab      Lot number solution      EXP date solution      LOT NUMBER POC      EXPIRATION DATE     AMB POC STREP GO A DIRECT, DNA PROBE   Result Value Ref Range    Valid Internal Control, POC yes     Strep pyogenes DNA, POC Negative Not Detected       
Sherita Santizo is a 39 y.o. female presenting for/with:    Chief Complaint   Patient presents with    Cough     SOB when walking (patient is also pregnant)    Congestion     Runny nose    Pharyngitis       Vitals:    11/01/24 1400   Pulse: 97   Resp: 18   Temp: 98.7 °F (37.1 °C)   TempSrc: Temporal   SpO2: 94%       Pain Scale: /10  Pain Location:     \"Have you been to the ER, urgent care clinic since your last visit?  Hospitalized since your last visit?\"    NO    “Have you seen or consulted any other health care providers outside of [facility] since your last visit?”    NO        “Have you had a pap smear?”    NO    No cervical cancer screening on file               11/1/2024     2:18 PM   PHQ-9    Little interest or pleasure in doing things 0   Feeling down, depressed, or hopeless 0   Trouble falling or staying asleep, or sleeping too much 0   Feeling tired or having little energy 0   Poor appetite or overeating 0   Feeling bad about yourself - or that you are a failure or have let yourself or your family down 0   Trouble concentrating on things, such as reading the newspaper or watching television 0   Moving or speaking so slowly that other people could have noticed. Or the opposite - being so fidgety or restless that you have been moving around a lot more than usual 0   Thoughts that you would be better off dead, or of hurting yourself in some way 0   PHQ-2 Score 0   PHQ-9 Total Score 0   If you checked off any problems, how difficult have these problems made it for you to do your work, take care of things at home, or get along with other people? 0           5/30/2023     7:50 AM   [facility] AMB LEARNING ASSESSMENT   Primary Learner Patient   Primary Language ENGLISH   Learning Preference DEMONSTRATION   Answered By patient   Relationship to Learner SELF            11/1/2024     2:18 PM   Amb Fall Risk Assessment and TUG Test   Do you feel unsteady or are you worried about falling?  no   2 or more 
daily     No current facility-administered medications for this visit.     ROS:   General: No fever, chills, or abnormal weight loss  Respiratory: No cough, dyspnea  CV: No chest pain, palpitations    Objective:  Visit Vitals  Pulse 97   Temp 98.7 °F (37.1 °C) (Temporal)   Resp 18   SpO2 94%     Wt Readings from Last 3 Encounters:   09/07/24 (!) 167.8 kg (370 lb)   12/23/23 (!) 167.8 kg (370 lb)   05/30/23 (!) 172.1 kg (379 lb 6.4 oz)     BP Readings from Last 3 Encounters:   09/07/24 134/71   06/03/24 124/71   12/23/23 126/80     Physical Examination: General appearance - alert, well appearing, and in no distress  Mental status - alert, oriented to person, place, and time  Eyes - pupils equal and reactive, extraocular eye movements intact  ENT - bilateral external ears and nose normal. Normal lips  Neck - supple, no significant adenopathy, no thyromegaly or mass  Chest - mild apical wheezing on auscultation, no rales or rhonchi, symmetric air entry  Heart - normal rate, regular rhythm, normal S1, S2, no murmurs, rubs, clicks or gallops    A/p:  Asthma exacerbation, likely triggered by URI  No signs severe illness, but does acute sx control and needs to resume ICS/LABA until sx clear or s/p delivery.    Pregnancy  Avoid teratogenic meds. Coordinate with OB/GYN    F/U PRN if not improving.

## 2024-11-20 ENCOUNTER — TELEPHONE (OUTPATIENT)
Age: 39
End: 2024-11-20

## 2024-11-20 DIAGNOSIS — J02.9 SORE THROAT: ICD-10-CM

## 2024-11-20 DIAGNOSIS — J45.31 MILD PERSISTENT ASTHMA WITH ACUTE EXACERBATION: ICD-10-CM

## 2024-11-20 DIAGNOSIS — R05.9 COUGH, UNSPECIFIED TYPE: ICD-10-CM

## 2024-11-20 RX ORDER — NEBULIZER ACCESSORIES
1 KIT MISCELLANEOUS DAILY PRN
Qty: 1 KIT | Refills: 0 | Status: SHIPPED | OUTPATIENT
Start: 2024-11-20

## 2024-11-20 RX ORDER — PREDNISONE 20 MG/1
TABLET ORAL
Qty: 14 TABLET | Refills: 0 | Status: SHIPPED | OUTPATIENT
Start: 2024-11-20 | End: 2024-12-02

## 2024-11-20 NOTE — TELEPHONE ENCOUNTER
PT contacted clinic, still having a lot of bad cough. Was doing better with the prednisone course we ore earlier this mo, but has had recurrent sx since finishing course. No fever/chills or hemoptysis, no severe dyspnea, but is in 3rd trimester of pregnancy.  Will renew neb tubing and prednisone taper for another 12d. Monitor for worsening.

## 2025-01-08 RX ORDER — MONTELUKAST SODIUM 10 MG/1
10 TABLET ORAL DAILY
Qty: 90 TABLET | Refills: 3 | Status: SHIPPED | OUTPATIENT
Start: 2025-01-08

## 2025-02-10 ENCOUNTER — OFFICE VISIT (OUTPATIENT)
Age: 40
End: 2025-02-10
Payer: COMMERCIAL

## 2025-02-10 VITALS
BODY MASS INDEX: 45.99 KG/M2 | TEMPERATURE: 98.6 F | DIASTOLIC BLOOD PRESSURE: 92 MMHG | WEIGHT: 293 LBS | OXYGEN SATURATION: 96 % | HEART RATE: 102 BPM | RESPIRATION RATE: 18 BRPM | SYSTOLIC BLOOD PRESSURE: 131 MMHG | HEIGHT: 67 IN

## 2025-02-10 DIAGNOSIS — N76.0 ACUTE VAGINITIS: Primary | ICD-10-CM

## 2025-02-10 LAB
BILIRUBIN, URINE, POC: NEGATIVE
BLOOD URINE, POC: ABNORMAL
GLUCOSE URINE, POC: NEGATIVE
KETONES, URINE, POC: NEGATIVE
LEUKOCYTE ESTERASE, URINE, POC: NEGATIVE
NITRITE, URINE, POC: NEGATIVE
PH, URINE, POC: 5.5 (ref 4.6–8)
PROTEIN,URINE, POC: ABNORMAL
SPECIFIC GRAVITY, URINE, POC: 1.03 (ref 1–1.03)
URINALYSIS CLARITY, POC: CLEAR
URINALYSIS COLOR, POC: YELLOW
UROBILINOGEN, POC: ABNORMAL

## 2025-02-10 PROCEDURE — 81003 URINALYSIS AUTO W/O SCOPE: CPT | Performed by: FAMILY MEDICINE

## 2025-02-10 PROCEDURE — 99213 OFFICE O/P EST LOW 20 MIN: CPT | Performed by: FAMILY MEDICINE

## 2025-02-10 RX ORDER — FLUCONAZOLE 150 MG/1
150 TABLET ORAL ONCE
Qty: 1 TABLET | Refills: 0 | Status: SHIPPED | OUTPATIENT
Start: 2025-02-10 | End: 2025-02-10

## 2025-02-10 SDOH — ECONOMIC STABILITY: FOOD INSECURITY: WITHIN THE PAST 12 MONTHS, THE FOOD YOU BOUGHT JUST DIDN'T LAST AND YOU DIDN'T HAVE MONEY TO GET MORE.: NEVER TRUE

## 2025-02-10 SDOH — ECONOMIC STABILITY: FOOD INSECURITY: WITHIN THE PAST 12 MONTHS, YOU WORRIED THAT YOUR FOOD WOULD RUN OUT BEFORE YOU GOT MONEY TO BUY MORE.: NEVER TRUE

## 2025-02-10 ASSESSMENT — PATIENT HEALTH QUESTIONNAIRE - PHQ9
6. FEELING BAD ABOUT YOURSELF - OR THAT YOU ARE A FAILURE OR HAVE LET YOURSELF OR YOUR FAMILY DOWN: NOT AT ALL
7. TROUBLE CONCENTRATING ON THINGS, SUCH AS READING THE NEWSPAPER OR WATCHING TELEVISION: NOT AT ALL
5. POOR APPETITE OR OVEREATING: NOT AT ALL
1. LITTLE INTEREST OR PLEASURE IN DOING THINGS: NOT AT ALL
SUM OF ALL RESPONSES TO PHQ9 QUESTIONS 1 & 2: 0
SUM OF ALL RESPONSES TO PHQ QUESTIONS 1-9: 0
2. FEELING DOWN, DEPRESSED OR HOPELESS: NOT AT ALL
8. MOVING OR SPEAKING SO SLOWLY THAT OTHER PEOPLE COULD HAVE NOTICED. OR THE OPPOSITE, BEING SO FIGETY OR RESTLESS THAT YOU HAVE BEEN MOVING AROUND A LOT MORE THAN USUAL: NOT AT ALL
3. TROUBLE FALLING OR STAYING ASLEEP: NOT AT ALL
4. FEELING TIRED OR HAVING LITTLE ENERGY: NOT AT ALL
SUM OF ALL RESPONSES TO PHQ QUESTIONS 1-9: 0
10. IF YOU CHECKED OFF ANY PROBLEMS, HOW DIFFICULT HAVE THESE PROBLEMS MADE IT FOR YOU TO DO YOUR WORK, TAKE CARE OF THINGS AT HOME, OR GET ALONG WITH OTHER PEOPLE: NOT DIFFICULT AT ALL
SUM OF ALL RESPONSES TO PHQ QUESTIONS 1-9: 0
9. THOUGHTS THAT YOU WOULD BE BETTER OFF DEAD, OR OF HURTING YOURSELF: NOT AT ALL
SUM OF ALL RESPONSES TO PHQ QUESTIONS 1-9: 0

## 2025-02-10 NOTE — ACP (ADVANCE CARE PLANNING)
Most recent advance directive on file Emergency contacts verified.    0 = understands/communicates without difficulty

## 2025-02-10 NOTE — PROGRESS NOTES
Sherita Santizo is a 39 y.o. female  Chief Complaint   Patient presents with    Vaginitis     Yeast infection started a few days ago. Received a course of antibiotics for periorbital cellulitis.  Dry, itchiness, burning with and without urination.       HPI:  Vaginitis sx  Symptoms include vaginal irritation and discomfort starting 2d ago. Started after a course of Augmentin for periorbital cellulitis and sinusitis in late JAN 2024. Rec by her GYN to get a test for BV vs Candida. Recommended not to take topicals PRN    Follow- up for hypothyroidism.   Last TSH  In goal. Current dose of levothyroxine 200mcg/d. Current symptoms:  None   Lab Results   Component Value Date/Time    TSH 0.35 11/10/2023 07:33 AM       Reviewed PMH, PSH, SH, Medications, allergies (see chart).  Current Outpatient Medications   Medication Sig    montelukast (SINGULAIR) 10 MG tablet Take 1 tablet by mouth daily.    fluticasone-salmeterol (ADVAIR HFA) 115-21 MCG/ACT inhaler Inhale 2 puffs into the lungs 2 times daily    levothyroxine (SYNTHROID) 200 MCG tablet Take 1 tablet by mouth every morning (before breakfast)    albuterol (PROVENTIL) (2.5 MG/3ML) 0.083% nebulizer solution Take 3 mLs by nebulization 4 times daily as needed for Wheezing    albuterol sulfate HFA (VENTOLIN HFA) 108 (90 Base) MCG/ACT inhaler Inhale 2 puffs into the lungs 4 times daily as needed for Wheezing    FIBER PO fiber    Prenatal Vit-Fe Fumarate-FA (PRENATAL VITAMIN) 27-0.8 MG TABS Take 1 tablet by mouth daily    albuterol sulfate HFA (PROVENTIL;VENTOLIN;PROAIR) 108 (90 Base) MCG/ACT inhaler Inhale 2 puffs into the lungs every 4 hours as needed    loratadine (CLARITIN) 10 MG capsule Take 1 capsule by mouth daily    Respiratory Therapy Supplies (NEBULIZER/TUBING/MOUTHPIECE) KIT 1 kit by Does not apply route daily as needed (asthma) (Patient not taking: Reported on 2/10/2025)    metFORMIN (GLUCOPHAGE-XR) 500 MG extended release tablet Take 2 tablets by mouth in the 
Preference DEMONSTRATION   Answered By patient   Relationship to Learner SELF            2/10/2025     1:46 PM   Amb Fall Risk Assessment and TUG Test   Do you feel unsteady or are you worried about falling?  no   2 or more falls in past year? no   Fall with injury in past year? no           2/10/2025     1:00 PM 11/1/2024     2:00 PM 12/29/2023    11:00 AM   ADL ASSESSMENT   Feeding yourself No Help Needed No Help Needed No Help Needed   Getting from bed to chair No Help Needed No Help Needed No Help Needed   Getting dressed No Help Needed No Help Needed No Help Needed   Bathing or showering No Help Needed No Help Needed No Help Needed   Walk across the room (includes cane/walker) No Help Needed No Help Needed No Help Needed   Using the telphone No Help Needed No Help Needed No Help Needed   Taking your medications No Help Needed No Help Needed No Help Needed   Preparing meals No Help Needed No Help Needed No Help Needed   Managing money (expenses/bills) No Help Needed No Help Needed No Help Needed   Moderately strenuous housework (laundry) No Help Needed No Help Needed No Help Needed   Shopping for personal items (toiletries/medicines) No Help Needed No Help Needed No Help Needed   Shopping for groceries No Help Needed No Help Needed No Help Needed   Driving No Help Needed No Help Needed No Help Needed   Climbing a flight of stairs No Help Needed No Help Needed No Help Needed   Getting to places beyond walking distances No Help Needed No Help Needed No Help Needed           2/10/2025     1:00 PM   AMB Abuse Screening   Do you ever feel afraid of your partner? N   Are you in a relationship with someone who physically or mentally threatens you? N   Is it safe for you to go home? Y       Advance Care Planning     The patient has appointed the following active healthcare agents:    Primary Decision Maker: Fransisco Santizo - Spouse - [phone]    Results for orders placed or performed in visit on 02/10/25   AMB POC

## 2025-02-14 LAB
A VAGINAE DNA VAG QL NAA+PROBE: NORMAL SCORE
BVAB2 DNA VAG QL NAA+PROBE: NORMAL SCORE
MEGA1 DNA VAG QL NAA+PROBE: NORMAL SCORE
SPECIMEN SOURCE: NORMAL

## 2025-02-19 ENCOUNTER — PATIENT MESSAGE (OUTPATIENT)
Age: 40
End: 2025-02-19

## 2025-02-19 DIAGNOSIS — N76.0 ACUTE VAGINITIS: ICD-10-CM

## 2025-02-19 DIAGNOSIS — J01.90 ACUTE BACTERIAL SINUSITIS: Primary | ICD-10-CM

## 2025-02-19 DIAGNOSIS — B96.89 ACUTE BACTERIAL SINUSITIS: Primary | ICD-10-CM

## 2025-02-21 RX ORDER — FLUCONAZOLE 150 MG/1
150 TABLET ORAL ONCE
Qty: 1 TABLET | Refills: 0 | Status: SHIPPED | OUTPATIENT
Start: 2025-02-21 | End: 2025-02-21

## 2025-02-21 NOTE — TELEPHONE ENCOUNTER
Hx severe sinusitis with cheek/periorbital cellulitis recently, with some recurrent sinus congestion sx without fever. Monitor, use sterile saline rinse and nasal corticosteroid spray, but if showing signs of worsening infection. Treat with Augmentin.

## 2025-04-21 ENCOUNTER — HOSPITAL ENCOUNTER (EMERGENCY)
Facility: HOSPITAL | Age: 40
Discharge: HOME | End: 2025-04-21
Attending: EMERGENCY MEDICINE
Payer: COMMERCIAL

## 2025-04-21 VITALS
SYSTOLIC BLOOD PRESSURE: 131 MMHG | BODY MASS INDEX: 45.99 KG/M2 | HEART RATE: 103 BPM | RESPIRATION RATE: 18 BRPM | TEMPERATURE: 100 F | HEIGHT: 67 IN | OXYGEN SATURATION: 97 % | DIASTOLIC BLOOD PRESSURE: 77 MMHG | WEIGHT: 293 LBS

## 2025-04-21 DIAGNOSIS — B34.9 VIRAL ILLNESS: Primary | ICD-10-CM

## 2025-04-21 DIAGNOSIS — R50.9 FEVER, UNSPECIFIED FEVER CAUSE: ICD-10-CM

## 2025-04-21 LAB
FLUAV RNA SPEC QL NAA+PROBE: NOT DETECTED
FLUBV RNA SPEC QL NAA+PROBE: NOT DETECTED
S PYO DNA THROAT QL NAA+PROBE: NOT DETECTED
SARS-COV-2 RNA RESP QL NAA+PROBE: NOT DETECTED
SOURCE: NORMAL

## 2025-04-21 PROCEDURE — 87651 STREP A DNA AMP PROBE: CPT

## 2025-04-21 PROCEDURE — 99283 EMERGENCY DEPT VISIT LOW MDM: CPT

## 2025-04-21 PROCEDURE — 87636 SARSCOV2 & INF A&B AMP PRB: CPT

## 2025-04-21 PROCEDURE — 6370000000 HC RX 637 (ALT 250 FOR IP): Performed by: EMERGENCY MEDICINE

## 2025-04-21 RX ORDER — IBUPROFEN 600 MG/1
600 TABLET, FILM COATED ORAL
Status: COMPLETED | OUTPATIENT
Start: 2025-04-21 | End: 2025-04-21

## 2025-04-21 RX ADMIN — IBUPROFEN 600 MG: 600 TABLET, FILM COATED ORAL at 21:30

## 2025-04-21 ASSESSMENT — LIFESTYLE VARIABLES
HOW MANY STANDARD DRINKS CONTAINING ALCOHOL DO YOU HAVE ON A TYPICAL DAY: PATIENT DOES NOT DRINK
HOW OFTEN DO YOU HAVE A DRINK CONTAINING ALCOHOL: NEVER

## 2025-04-21 ASSESSMENT — PAIN - FUNCTIONAL ASSESSMENT: PAIN_FUNCTIONAL_ASSESSMENT: 0-10

## 2025-04-21 ASSESSMENT — PAIN SCALES - GENERAL: PAINLEVEL_OUTOF10: 0

## 2025-04-22 NOTE — PROGRESS NOTES
Contacted pt to check status s/p ER visit. Pt reports fever waxing and waning, luis po fluids ok, no n/v. No redness/TTP to breast. Rec staying hydrated. Monitor for now. PT gave understanding.

## 2025-04-22 NOTE — ED TRIAGE NOTES
Pt reports that she woke up with a fever and chills. Reports nasal congestion and headache. 15 weeks postpartum. Temp as high as 103 per pt. Took Tylenol at 1100 and 1800.

## 2025-04-22 NOTE — ED PROVIDER NOTES
Arcadio SALEH MD  [address] [facility] [address]  [phone]    Schedule an appointment as soon as possible for a visit       [facility]  [address]  [facility] [address] [phone]    If symptoms worsen         DISCHARGE MEDICATIONS:     Medication List        ASK your doctor about these medications      * albuterol sulfate  (90 Base) MCG/ACT inhaler  Commonly known as: PROVENTIL;VENTOLIN;PROAIR     * albuterol sulfate  (90 Base) MCG/ACT inhaler  Commonly known as: Ventolin HFA  Inhale 2 puffs into the lungs 4 times daily as needed for Wheezing     * albuterol (2.5 MG/3ML) 0.083% nebulizer solution  Commonly known as: PROVENTIL  Take 3 mLs by nebulization 4 times daily as needed for Wheezing     FIBER PO     fluticasone-salmeterol 115-21 MCG/ACT inhaler  Commonly known as: Advair HFA  Inhale 2 puffs into the lungs 2 times daily     levothyroxine 200 MCG tablet  Commonly known as: SYNTHROID  Take 1 tablet by mouth every morning (before breakfast)     loratadine 10 MG capsule  Commonly known as: CLARITIN     montelukast 10 MG tablet  Commonly known as: SINGULAIR  Take 1 tablet by mouth daily.     prenatal vitamin 27-0.8 MG Tabs  Take 1 tablet by mouth daily           * This list has 3 medication(s) that are the same as other medications prescribed for you. Read the directions carefully, and ask your doctor or other care provider to review them with you.                    DISCONTINUED MEDICATIONS:  Discharge Medication List as of 4/21/2025 10:12 PM          I am the Primary Clinician of Record.   Nadege Henriquez MD (electronically signed)      (Please note that parts of this dictation were completed with voice recognition software. Quite often unanticipated grammatical, syntax, homophones, and other interpretive errors are inadvertently transcribed by the computer software. Please disregards these errors. Please excuse any errors that have escaped final

## 2025-04-26 ENCOUNTER — HOSPITAL ENCOUNTER (EMERGENCY)
Facility: HOSPITAL | Age: 40
Discharge: HOME | End: 2025-04-26
Attending: EMERGENCY MEDICINE
Payer: COMMERCIAL

## 2025-04-26 VITALS
DIASTOLIC BLOOD PRESSURE: 99 MMHG | HEART RATE: 99 BPM | OXYGEN SATURATION: 97 % | BODY MASS INDEX: 45.99 KG/M2 | RESPIRATION RATE: 24 BRPM | HEIGHT: 67 IN | WEIGHT: 293 LBS | TEMPERATURE: 98.4 F | SYSTOLIC BLOOD PRESSURE: 186 MMHG

## 2025-04-26 DIAGNOSIS — H65.03 NON-RECURRENT ACUTE SEROUS OTITIS MEDIA OF BOTH EARS: Primary | ICD-10-CM

## 2025-04-26 DIAGNOSIS — J01.90 ACUTE NON-RECURRENT SINUSITIS, UNSPECIFIED LOCATION: ICD-10-CM

## 2025-04-26 DIAGNOSIS — R03.0 ELEVATED BLOOD PRESSURE READING: ICD-10-CM

## 2025-04-26 PROCEDURE — 99283 EMERGENCY DEPT VISIT LOW MDM: CPT

## 2025-04-26 PROCEDURE — 6370000000 HC RX 637 (ALT 250 FOR IP): Performed by: EMERGENCY MEDICINE

## 2025-04-26 RX ORDER — AMOXICILLIN 875 MG/1
875 TABLET, COATED ORAL 2 TIMES DAILY
Qty: 20 TABLET | Refills: 0 | Status: SHIPPED | OUTPATIENT
Start: 2025-04-26 | End: 2025-05-06

## 2025-04-26 RX ORDER — ACETAMINOPHEN 325 MG/1
650 TABLET ORAL
Status: COMPLETED | OUTPATIENT
Start: 2025-04-26 | End: 2025-04-26

## 2025-04-26 RX ORDER — IBUPROFEN 400 MG/1
400 TABLET, FILM COATED ORAL
Status: COMPLETED | OUTPATIENT
Start: 2025-04-26 | End: 2025-04-26

## 2025-04-26 RX ADMIN — IBUPROFEN 400 MG: 400 TABLET, FILM COATED ORAL at 16:24

## 2025-04-26 RX ADMIN — ACETAMINOPHEN 650 MG: 325 TABLET ORAL at 16:24

## 2025-04-26 ASSESSMENT — PAIN SCALES - GENERAL: PAINLEVEL_OUTOF10: 8

## 2025-04-26 ASSESSMENT — PAIN - FUNCTIONAL ASSESSMENT: PAIN_FUNCTIONAL_ASSESSMENT: 0-10

## 2025-04-26 NOTE — ED PROVIDER NOTES
EMERGENCY DEPARTMENT HISTORY AND PHYSICAL EXAM      Date: 4/26/2025  Patient Name: Sherita Santizo    History of Presenting Illness     Chief Complaint   Patient presents with   • Nasal Congestion   • Facial Pain       History Provided By: Patient     HPI: Sherita Santizo, 39 y.o. female with past medical history listed below, presents via private vehicle to the ED with cc of concern for possible sinus infection.  Patient reports she was seen here on Monday and diagnosed with a viral URI.  She tested negative for COVID flu and strep.  She reports some low-grade temperatures of 100.5 but those have mostly subsided.  However she reports worsening sinus congestion and ear fullness bilaterally.  Reports sinus pressure in her maxillary area.  Has been using a sinus rinse with minimal improvement.    She is already on Claritin and Singulair.        There are no other complaints, changes, or physical findings at this time.    PCP: Arcadio Venegas MD    No current facility-administered medications on file prior to encounter.     Current Outpatient Medications on File Prior to Encounter   Medication Sig Dispense Refill   • montelukast (SINGULAIR) 10 MG tablet Take 1 tablet by mouth daily. 90 tablet 3   • fluticasone-salmeterol (ADVAIR HFA) 115-21 MCG/ACT inhaler Inhale 2 puffs into the lungs 2 times daily (Patient not taking: Reported on 4/21/2025) 1 each 3   • levothyroxine (SYNTHROID) 200 MCG tablet Take 1 tablet by mouth every morning (before breakfast) 90 tablet 3   • albuterol (PROVENTIL) (2.5 MG/3ML) 0.083% nebulizer solution Take 3 mLs by nebulization 4 times daily as needed for Wheezing (Patient not taking: Reported on 4/21/2025) 30 each 11   • albuterol sulfate HFA (VENTOLIN HFA) 108 (90 Base) MCG/ACT inhaler Inhale 2 puffs into the lungs 4 times daily as needed for Wheezing 18 g 5   • FIBER PO fiber (Patient not taking: Reported on 4/21/2025)     • Prenatal Vit-Fe Fumarate-FA (PRENATAL VITAMIN) 27-0.8 MG TABS  understanding and agreement with plan.           Specific return precautions provided as well as instructions to return to the ED should sx worsen at any time. Vital signs stable for discharge.     I have also put together some discharge instructions for them that include: 1) educational information regarding their diagnosis, 2) how to care for their diagnosis at home, as well a 3) list of reasons why they would want to return to the ED prior to their follow-up appointment, should their condition change.    Written by Oscar Ball MD        Critical Care Time:   0    Disposition:  Discharge    PLAN:  1.      Medication List        START taking these medications      amoxicillin 875 MG tablet  Commonly known as: AMOXIL  Take 1 tablet by mouth 2 times daily for 10 days            ASK your doctor about these medications      * albuterol sulfate  (90 Base) MCG/ACT inhaler  Commonly known as: PROVENTIL;VENTOLIN;PROAIR     * albuterol sulfate  (90 Base) MCG/ACT inhaler  Commonly known as: Ventolin HFA  Inhale 2 puffs into the lungs 4 times daily as needed for Wheezing     * albuterol (2.5 MG/3ML) 0.083% nebulizer solution  Commonly known as: PROVENTIL  Take 3 mLs by nebulization 4 times daily as needed for Wheezing     FIBER PO     fluticasone-salmeterol 115-21 MCG/ACT inhaler  Commonly known as: Advair HFA  Inhale 2 puffs into the lungs 2 times daily     levothyroxine 200 MCG tablet  Commonly known as: SYNTHROID  Take 1 tablet by mouth every morning (before breakfast)     loratadine 10 MG capsule  Commonly known as: CLARITIN     montelukast 10 MG tablet  Commonly known as: SINGULAIR  Take 1 tablet by mouth daily.     prenatal vitamin 27-0.8 MG Tabs  Take 1 tablet by mouth daily           * This list has 3 medication(s) that are the same as other medications prescribed for you. Read the directions carefully, and ask your doctor or other care provider to review them with you.                   Where to Get  generalized

## 2025-04-26 NOTE — ED TRIAGE NOTES
Presents with c/o severe sinus pressure, ear fullness and  \"vertigo\". Patient reports taking (2) tylenol regular strength and 600mg ibuprofen at 0918 without relief.   Patient was seen in ER on 4/21/25 for similar c/o.   Of note, patient is breastfeeding.

## 2025-05-02 DIAGNOSIS — J32.9 CHRONIC SINUSITIS, UNSPECIFIED LOCATION: Primary | ICD-10-CM

## 2025-06-05 DIAGNOSIS — R05.9 COUGH, UNSPECIFIED TYPE: ICD-10-CM

## 2025-06-05 DIAGNOSIS — J45.31 MILD PERSISTENT ASTHMA WITH ACUTE EXACERBATION: ICD-10-CM

## 2025-06-05 DIAGNOSIS — B33.8 RSV (RESPIRATORY SYNCYTIAL VIRUS INFECTION): ICD-10-CM

## 2025-06-06 RX ORDER — ALBUTEROL SULFATE 90 UG/1
INHALANT RESPIRATORY (INHALATION)
Qty: 8.5 G | Refills: 11 | Status: SHIPPED | OUTPATIENT
Start: 2025-06-06

## (undated) LAB
EXP DATE SOLUTION: (no result)
EXP DATE SWAB: (no result)
FLUAV+FLUBV AG NOSE QL IA.RAPID: NEGATIVE
FLUAV+FLUBV AG NOSE QL IA.RAPID: NEGATIVE
LOT NUMBER SOLUTION: (no result)
LOT NUMBER SWAB: (no result)
S PYO AG THROAT QL: NEGATIVE
SARS-COV-2 RNA POC: NEGATIVE
VALID INTERNAL CONTROL?: YES
VALID INTERNAL CONTROL?: YES

## (undated) DEVICE — MEDI-VAC NON-CONDUCTIVE SUCTION TUBING: Brand: CARDINAL HEALTH

## (undated) DEVICE — COVERALL PREM SMS 2XL KNIT --

## (undated) DEVICE — SOLUTION IRRIG 1000ML H2O STRL BLT

## (undated) DEVICE — EXTRA LARGE, DISPOSABLE C-SECTION PROTECTOR - RETRACTOR: Brand: ALEXIS ® O C-SECTION PROTECTOR - RETRACTOR

## (undated) DEVICE — SOLUTION IV 1000ML 0.9% SOD CHL

## (undated) DEVICE — DRAPE FLD WRM W44XL66IN C6L FOR INTRATEMP SYS THERMABASIN

## (undated) DEVICE — 3000CC GUARDIAN II: Brand: GUARDIAN

## (undated) DEVICE — STERILE POLYISOPRENE POWDER-FREE SURGICAL GLOVES: Brand: PROTEXIS

## (undated) DEVICE — SUTURE PLN GUT SZ 2-0 L27IN ABSRB YELLOWISH TAN L70MM XLH 53T

## (undated) DEVICE — LIGHT HANDLE: Brand: DEVON

## (undated) DEVICE — ROYALSILK SURGICAL GOWN, L: Brand: CONVERTORS

## (undated) DEVICE — TRAXI PANNICULUS RETRACTOR WITH RETENTUS TECHNOLOGY (BMI 30-50): Brand: TRAXI® PANNICULUS RETRACTOR

## (undated) DEVICE — SUTURE VCRL SZ 0 L36IN ABSRB UD L40MM CT 1/2 CIR TAPERPOINT J958H

## (undated) DEVICE — STAPLER SKIN SQ 30 ABSRB STPL DISP INSORB

## (undated) DEVICE — KENDALL SCD EXPRESS SLEEVES, KNEE LENGTH, MEDIUM: Brand: KENDALL SCD

## (undated) DEVICE — PACK PROCEDURE SURG C SECT KT SMH

## (undated) DEVICE — SOLIDIFIER MEDC 1200ML -- CONVERT TO 356117

## (undated) DEVICE — (D)PREP SKN CHLRAPRP APPL 26ML -- CONVERT TO ITEM 371833

## (undated) DEVICE — REM POLYHESIVE ADULT PATIENT RETURN ELECTRODE: Brand: VALLEYLAB

## (undated) DEVICE — POWDER HEMOSTAT GEL 3.0GR -- SURGICEL

## (undated) DEVICE — DEVON™ KNEE AND BODY STRAP 60" X 3" (1.5 M X 7.6 CM): Brand: DEVON

## (undated) DEVICE — CATH FOLEY 16F LUBRI-SIL IC --